# Patient Record
Sex: FEMALE | Race: WHITE | NOT HISPANIC OR LATINO
[De-identification: names, ages, dates, MRNs, and addresses within clinical notes are randomized per-mention and may not be internally consistent; named-entity substitution may affect disease eponyms.]

---

## 2019-02-25 PROBLEM — Z00.00 ENCOUNTER FOR PREVENTIVE HEALTH EXAMINATION: Status: ACTIVE | Noted: 2019-02-25

## 2019-02-27 ENCOUNTER — ASOB RESULT (OUTPATIENT)
Age: 34
End: 2019-02-27

## 2019-02-27 ENCOUNTER — APPOINTMENT (OUTPATIENT)
Dept: ANTEPARTUM | Facility: CLINIC | Age: 34
End: 2019-02-27
Payer: COMMERCIAL

## 2019-02-27 ENCOUNTER — TRANSCRIPTION ENCOUNTER (OUTPATIENT)
Age: 34
End: 2019-02-27

## 2019-02-27 PROCEDURE — 76801 OB US < 14 WKS SINGLE FETUS: CPT

## 2019-02-27 PROCEDURE — 99203 OFFICE O/P NEW LOW 30 MIN: CPT | Mod: 25

## 2019-02-28 ENCOUNTER — APPOINTMENT (OUTPATIENT)
Dept: PEDIATRIC MEDICAL GENETICS | Facility: CLINIC | Age: 34
End: 2019-02-28

## 2019-03-04 ENCOUNTER — OUTPATIENT (OUTPATIENT)
Dept: OUTPATIENT SERVICES | Facility: HOSPITAL | Age: 34
LOS: 1 days | End: 2019-03-04
Payer: COMMERCIAL

## 2019-03-04 ENCOUNTER — APPOINTMENT (OUTPATIENT)
Dept: OBGYN | Facility: CLINIC | Age: 34
End: 2019-03-04
Payer: COMMERCIAL

## 2019-03-04 ENCOUNTER — TRANSCRIPTION ENCOUNTER (OUTPATIENT)
Age: 34
End: 2019-03-04

## 2019-03-04 VITALS
SYSTOLIC BLOOD PRESSURE: 107 MMHG | OXYGEN SATURATION: 98 % | HEIGHT: 67 IN | TEMPERATURE: 98 F | HEART RATE: 99 BPM | WEIGHT: 181 LBS | RESPIRATION RATE: 14 BRPM | DIASTOLIC BLOOD PRESSURE: 73 MMHG

## 2019-03-04 VITALS
BODY MASS INDEX: 30.57 KG/M2 | SYSTOLIC BLOOD PRESSURE: 136 MMHG | DIASTOLIC BLOOD PRESSURE: 88 MMHG | WEIGHT: 183.5 LBS | HEIGHT: 65 IN

## 2019-03-04 DIAGNOSIS — O35.0XX0 MATERNAL CARE FOR (SUSPECTED) CENTRAL NERVOUS SYSTEM MALFORMATION IN FETUS, NOT APPLICABLE OR UNSPECIFIED: ICD-10-CM

## 2019-03-04 DIAGNOSIS — Z01.818 ENCOUNTER FOR OTHER PREPROCEDURAL EXAMINATION: ICD-10-CM

## 2019-03-04 DIAGNOSIS — Z78.9 OTHER SPECIFIED HEALTH STATUS: ICD-10-CM

## 2019-03-04 DIAGNOSIS — O35.9XX0 MATERNAL CARE FOR (SUSPECTED) FETAL ABNORMALITY AND DAMAGE, UNSPECIFIED, NOT APPLICABLE OR UNSPECIFIED: ICD-10-CM

## 2019-03-04 LAB
BASOPHILS # BLD AUTO: 0.03 K/UL
BASOPHILS NFR BLD AUTO: 0.3 %
BLD GP AB SCN SERPL QL: NEGATIVE — SIGNIFICANT CHANGE UP
EOSINOPHIL # BLD AUTO: 0.1 K/UL
EOSINOPHIL NFR BLD AUTO: 1.1 %
HCT VFR BLD CALC: 39.8 %
HCT VFR BLD CALC: 41 % — SIGNIFICANT CHANGE UP (ref 34.5–45)
HGB BLD-MCNC: 13 G/DL
HGB BLD-MCNC: 13.2 G/DL — SIGNIFICANT CHANGE UP (ref 11.5–15.5)
IMM GRANULOCYTES NFR BLD AUTO: 0.4 %
LYMPHOCYTES # BLD AUTO: 1.48 K/UL
LYMPHOCYTES NFR BLD AUTO: 16.6 %
MAN DIFF?: NORMAL
MCHC RBC-ENTMCNC: 29.3 PG — SIGNIFICANT CHANGE UP (ref 27–34)
MCHC RBC-ENTMCNC: 29.9 PG
MCHC RBC-ENTMCNC: 32.2 GM/DL — SIGNIFICANT CHANGE UP (ref 32–36)
MCHC RBC-ENTMCNC: 32.7 GM/DL
MCV RBC AUTO: 90.9 FL — SIGNIFICANT CHANGE UP (ref 80–100)
MCV RBC AUTO: 91.5 FL
MONOCYTES # BLD AUTO: 0.78 K/UL
MONOCYTES NFR BLD AUTO: 8.7 %
NEUTROPHILS # BLD AUTO: 6.51 K/UL
NEUTROPHILS NFR BLD AUTO: 72.9 %
PLATELET # BLD AUTO: 289 K/UL
PLATELET # BLD AUTO: 295 K/UL — SIGNIFICANT CHANGE UP (ref 150–400)
RBC # BLD: 4.35 M/UL
RBC # BLD: 4.51 M/UL — SIGNIFICANT CHANGE UP (ref 3.8–5.2)
RBC # FLD: 12.6 % — SIGNIFICANT CHANGE UP (ref 10.3–14.5)
RBC # FLD: 12.8 %
RH IG SCN BLD-IMP: POSITIVE — SIGNIFICANT CHANGE UP
WBC # BLD: 11.5 K/UL — HIGH (ref 3.8–10.5)
WBC # FLD AUTO: 11.5 K/UL — HIGH (ref 3.8–10.5)
WBC # FLD AUTO: 8.94 K/UL

## 2019-03-04 PROCEDURE — 86850 RBC ANTIBODY SCREEN: CPT

## 2019-03-04 PROCEDURE — 86900 BLOOD TYPING SEROLOGIC ABO: CPT

## 2019-03-04 PROCEDURE — 86901 BLOOD TYPING SEROLOGIC RH(D): CPT

## 2019-03-04 PROCEDURE — 99205 OFFICE O/P NEW HI 60 MIN: CPT

## 2019-03-04 PROCEDURE — G0463: CPT

## 2019-03-04 PROCEDURE — 85027 COMPLETE CBC AUTOMATED: CPT

## 2019-03-04 RX ORDER — LIDOCAINE HCL 20 MG/ML
0.2 VIAL (ML) INJECTION ONCE
Qty: 0 | Refills: 0 | Status: DISCONTINUED | OUTPATIENT
Start: 2019-03-05 | End: 2019-03-20

## 2019-03-04 RX ORDER — SODIUM CHLORIDE 9 MG/ML
3 INJECTION INTRAMUSCULAR; INTRAVENOUS; SUBCUTANEOUS EVERY 8 HOURS
Qty: 0 | Refills: 0 | Status: DISCONTINUED | OUTPATIENT
Start: 2019-03-05 | End: 2019-03-20

## 2019-03-04 RX ORDER — FOLIC ACID 1 MG/1
1 TABLET ORAL DAILY
Qty: 120 | Refills: 11 | Status: ACTIVE | COMMUNITY
Start: 2019-03-04 | End: 1900-01-01

## 2019-03-04 NOTE — HISTORY OF PRESENT ILLNESS
[Definite:  ___ (Date)] : the last menstrual period was [unfilled] [Regular Cycle Intervals] : periods have been regular [Sexually Active] : is sexually active [Monogamous] : is monogamous

## 2019-03-04 NOTE — H&P PST ADULT - NSANTHOSAYNRD_GEN_A_CORE
No. JAN screening performed.  STOP BANG Legend: 0-2 = LOW Risk; 3-4 = INTERMEDIATE Risk; 5-8 = HIGH Risk

## 2019-03-04 NOTE — H&P PST ADULT - HISTORY OF PRESENT ILLNESS
Mrs. Ortega is a 33 year old woman with no significant medical history who is  who went for routine visit then ultrasound then referred to Maternal Child Medicine with diagnosis of Acalvaria in the fetus now scheduled for D&C tomorrow.  Patient denies vaginal bleeding or cramping.

## 2019-03-04 NOTE — H&P PST ADULT - PROBLEM SELECTOR PROBLEM 1
Maternal care for (suspected) central nervous system malformation in fetus, not applicable or unspecified

## 2019-03-04 NOTE — PHYSICAL EXAM
[Awake] : awake [Alert] : alert [Oriented x3] : oriented to person, place, and time [Acute Distress] : no acute distress [Depressed Mood] : not depressed [Flat Affect] : affect not flat

## 2019-03-04 NOTE — H&P PST ADULT - NEUROLOGICAL DETAILS
sensation intact/responds to verbal commands/responds to pain/alert and oriented x 3/normal strength

## 2019-03-04 NOTE — H&P PST ADULT - RS GEN PE MLT RESP DETAILS PC
No
airway patent/clear to auscultation bilaterally/breath sounds equal/respirations non-labored/good air movement

## 2019-03-05 ENCOUNTER — OUTPATIENT (OUTPATIENT)
Dept: OUTPATIENT SERVICES | Facility: HOSPITAL | Age: 34
LOS: 1 days | End: 2019-03-05
Payer: COMMERCIAL

## 2019-03-05 ENCOUNTER — APPOINTMENT (OUTPATIENT)
Dept: OBGYN | Facility: CLINIC | Age: 34
End: 2019-03-05

## 2019-03-05 ENCOUNTER — RESULT REVIEW (OUTPATIENT)
Age: 34
End: 2019-03-05

## 2019-03-05 VITALS
OXYGEN SATURATION: 100 % | HEART RATE: 88 BPM | SYSTOLIC BLOOD PRESSURE: 130 MMHG | TEMPERATURE: 97 F | RESPIRATION RATE: 20 BRPM | DIASTOLIC BLOOD PRESSURE: 84 MMHG

## 2019-03-05 VITALS — HEIGHT: 67 IN | WEIGHT: 181 LBS

## 2019-03-05 DIAGNOSIS — O35.9XX0 MATERNAL CARE FOR (SUSPECTED) FETAL ABNORMALITY AND DAMAGE, UNSPECIFIED, NOT APPLICABLE OR UNSPECIFIED: ICD-10-CM

## 2019-03-05 PROBLEM — N30.00 ACUTE CYSTITIS WITHOUT HEMATURIA: Chronic | Status: ACTIVE | Noted: 2019-03-04

## 2019-03-05 LAB
ABO + RH PNL BLD: NORMAL
C TRACH RRNA SPEC QL NAA+PROBE: NOT DETECTED
N GONORRHOEA RRNA SPEC QL NAA+PROBE: NOT DETECTED
SOURCE AMPLIFICATION: NORMAL

## 2019-03-05 PROCEDURE — 81229 CYTOG ALYS CHRML ABNR SNPCGH: CPT

## 2019-03-05 PROCEDURE — 88264 CHROMOSOME ANALYSIS 20-25: CPT

## 2019-03-05 PROCEDURE — 88305 TISSUE EXAM BY PATHOLOGIST: CPT | Mod: 26

## 2019-03-05 PROCEDURE — 88280 CHROMOSOME KARYOTYPE STUDY: CPT

## 2019-03-05 PROCEDURE — 88305 TISSUE EXAM BY PATHOLOGIST: CPT

## 2019-03-05 PROCEDURE — 88233 TISSUE CULTURE SKIN/BIOPSY: CPT

## 2019-03-05 PROCEDURE — 76998 US GUIDE INTRAOP: CPT | Mod: 26

## 2019-03-05 PROCEDURE — 59840 INDUCED ABORTION D&C: CPT

## 2019-03-05 PROCEDURE — 88291 CYTO/MOLECULAR REPORT: CPT

## 2019-03-05 RX ORDER — ACETAMINOPHEN 500 MG
1000 TABLET ORAL ONCE
Qty: 0 | Refills: 0 | Status: COMPLETED | OUTPATIENT
Start: 2019-03-05 | End: 2019-03-05

## 2019-03-05 RX ORDER — CELECOXIB 200 MG/1
200 CAPSULE ORAL ONCE
Qty: 0 | Refills: 0 | Status: COMPLETED | OUTPATIENT
Start: 2019-03-05 | End: 2019-03-05

## 2019-03-05 RX ORDER — ONDANSETRON 8 MG/1
4 TABLET, FILM COATED ORAL ONCE
Qty: 0 | Refills: 0 | Status: DISCONTINUED | OUTPATIENT
Start: 2019-03-05 | End: 2019-03-20

## 2019-03-05 RX ADMIN — Medication 400 MILLIGRAM(S): at 15:51

## 2019-03-05 NOTE — BRIEF OPERATIVE NOTE - PROCEDURE
<<-----Click on this checkbox to enter Procedure Dilation and curettage  03/05/2019  1. examination under anesthesia  2. dilation and curettage under ultrasound guidance  Active  Maria Guadalupe De Los Santos

## 2019-03-05 NOTE — ASU DISCHARGE PLAN (ADULT/PEDIATRIC) - CALL YOUR DOCTOR IF YOU HAVE ANY OF THE FOLLOWING:
Inability to tolerate liquids or foods/Fever greater than (need to indicate Fahrenheit or Celsius)/Bleeding that does not stop/Nausea and vomiting that does not stop/Numbness, tingling, color or temperature change to extremity/Pain not relieved by Medications/Unable to urinate

## 2019-03-05 NOTE — BRIEF OPERATIVE NOTE - POST-OP DX
Acalvaria  03/05/2019    Active  Maria Guadalupe De Los Santos
Acalvaria  03/05/2019    Active  Maria Guadalupe De Los Santos

## 2019-03-05 NOTE — ASU DISCHARGE PLAN (ADULT/PEDIATRIC) - CARE PROVIDER_API CALL
Tiffany Chen)  [address], [address] [place] [address]  Phone: [phone]  Fax: [phone]  Follow Up Time:

## 2019-03-05 NOTE — ASU DISCHARGE PLAN (ADULT/PEDIATRIC) - ASU DC SPECIAL INSTRUCTIONSFT
Expect abdominal cramping/pain and spotting for the next week. Take ibuprofen and Tylenol for cramping. Use a pad as needed. Call your physician or go to the emergency room if you experience any of the following: heavy vaginal bleeding (soaking more than 2 pads in 1 hour for 2 hours), fever, chills, nausea, vomiting, or pain that is not controlled by medication. Follow-up with you OB/GYN in 2 weeks.

## 2019-03-05 NOTE — BRIEF OPERATIVE NOTE - PROCEDURE
<<-----Click on this checkbox to enter Procedure Dilation and curettage  03/05/2019    Active  RBUESER

## 2019-03-05 NOTE — BRIEF OPERATIVE NOTE - PRE-OP DX
Acalvaria  03/05/2019  IUP @ 11 3/7 weeks  Active  Naida Marcus
Acalvaria  03/05/2019    Active  Maria Guadalupe De Los Santos

## 2019-03-05 NOTE — BRIEF OPERATIVE NOTE - OPERATION/FINDINGS
EUA revealed a 11 week size, anteverted uterus, adnexa nonpalpable b/l.  Contents of uterus removed using vacuum curettage under ultrasound guidance. Minimal bleeding

## 2019-03-11 ENCOUNTER — RESULT REVIEW (OUTPATIENT)
Age: 34
End: 2019-03-11

## 2019-03-11 LAB — SURGICAL PATHOLOGY STUDY: SIGNIFICANT CHANGE UP

## 2019-03-18 LAB — CHROM ANALY OVERALL INTERP SPEC-IMP: SIGNIFICANT CHANGE UP

## 2019-03-22 ENCOUNTER — APPOINTMENT (OUTPATIENT)
Dept: OBGYN | Facility: CLINIC | Age: 34
End: 2019-03-22

## 2019-03-22 VITALS
BODY MASS INDEX: 30.72 KG/M2 | HEART RATE: 96 BPM | OXYGEN SATURATION: 99 % | SYSTOLIC BLOOD PRESSURE: 144 MMHG | HEIGHT: 65 IN | DIASTOLIC BLOOD PRESSURE: 89 MMHG | WEIGHT: 184.38 LBS

## 2019-03-22 DIAGNOSIS — Z09 ENCOUNTER FOR FOLLOW-UP EXAMINATION AFTER COMPLETED TREATMENT FOR CONDITIONS OTHER THAN MALIGNANT NEOPLASM: ICD-10-CM

## 2019-03-30 PROBLEM — Z09 POSTOP CHECK: Status: ACTIVE | Noted: 2019-03-30

## 2019-03-30 NOTE — PHYSICAL EXAM
[Awake] : awake [Alert] : alert [Oriented x3] : oriented to person, place, and time [No Lesions] : no genitalia lesions [Labia Majora] : labia major [Labia Minora] : labia minora [Normal] : clitoris [No Bleeding] : there was no active vaginal bleeding [Anteversion] : anteverted [Uterine Adnexae] : were not tender and not enlarged [Acute Distress] : no acute distress [Depressed Mood] : not depressed [Flat Affect] : affect not flat [Discharge] : had no discharge [Dilated] : the cervix was not dilated [Motion Tenderness] : there was no cervical motion tenderness [Tenderness] : nontender [Enlarged ___ wks] : not enlarged

## 2019-04-27 LAB — MICROARRAY DELETION: SIGNIFICANT CHANGE UP

## 2019-05-03 ENCOUNTER — RESULT REVIEW (OUTPATIENT)
Age: 34
End: 2019-05-03

## 2019-07-18 ENCOUNTER — APPOINTMENT (OUTPATIENT)
Dept: ANTEPARTUM | Facility: CLINIC | Age: 34
End: 2019-07-18
Payer: COMMERCIAL

## 2019-07-18 ENCOUNTER — ASOB RESULT (OUTPATIENT)
Age: 34
End: 2019-07-18

## 2019-07-18 PROCEDURE — 36415 COLL VENOUS BLD VENIPUNCTURE: CPT

## 2019-07-18 PROCEDURE — 76801 OB US < 14 WKS SINGLE FETUS: CPT

## 2019-07-18 PROCEDURE — 99213 OFFICE O/P EST LOW 20 MIN: CPT | Mod: 25

## 2019-07-18 PROCEDURE — 36416 COLLJ CAPILLARY BLOOD SPEC: CPT

## 2019-07-18 PROCEDURE — 76813 OB US NUCHAL MEAS 1 GEST: CPT

## 2019-07-25 ENCOUNTER — APPOINTMENT (OUTPATIENT)
Dept: ANTEPARTUM | Facility: CLINIC | Age: 34
End: 2019-07-25

## 2019-08-19 ENCOUNTER — ASOB RESULT (OUTPATIENT)
Age: 34
End: 2019-08-19

## 2019-08-19 ENCOUNTER — APPOINTMENT (OUTPATIENT)
Dept: ANTEPARTUM | Facility: CLINIC | Age: 34
End: 2019-08-19
Payer: COMMERCIAL

## 2019-08-19 PROCEDURE — 99213 OFFICE O/P EST LOW 20 MIN: CPT | Mod: 25

## 2019-08-19 PROCEDURE — 76805 OB US >/= 14 WKS SNGL FETUS: CPT

## 2019-09-17 ENCOUNTER — APPOINTMENT (OUTPATIENT)
Dept: ANTEPARTUM | Facility: CLINIC | Age: 34
End: 2019-09-17
Payer: COMMERCIAL

## 2019-09-17 ENCOUNTER — ASOB RESULT (OUTPATIENT)
Age: 34
End: 2019-09-17

## 2019-09-17 PROCEDURE — 76811 OB US DETAILED SNGL FETUS: CPT

## 2019-10-17 NOTE — ASU PREOP CHECKLIST - BSA (M2)
A Family Medicine Doctor  Family Medicine  .  [place]   Phone:   Fax:   Follow Up Time: 1-3 Days    [facility]  ENT  [address], 2nd [place] [address]  Phone: [phone]  Fax:   Follow Up Time: 1-3 Days
1.94

## 2020-01-08 ENCOUNTER — APPOINTMENT (OUTPATIENT)
Dept: ANTEPARTUM | Facility: CLINIC | Age: 35
End: 2020-01-08
Payer: COMMERCIAL

## 2020-01-08 ENCOUNTER — OUTPATIENT (OUTPATIENT)
Dept: OUTPATIENT SERVICES | Facility: HOSPITAL | Age: 35
LOS: 1 days | End: 2020-01-08

## 2020-01-08 ENCOUNTER — APPOINTMENT (OUTPATIENT)
Dept: ANTEPARTUM | Facility: HOSPITAL | Age: 35
End: 2020-01-08

## 2020-01-08 ENCOUNTER — ASOB RESULT (OUTPATIENT)
Age: 35
End: 2020-01-08

## 2020-01-08 PROCEDURE — 76816 OB US FOLLOW-UP PER FETUS: CPT

## 2020-01-08 PROCEDURE — 76818 FETAL BIOPHYS PROFILE W/NST: CPT | Mod: 26

## 2020-01-08 PROCEDURE — 99214 OFFICE O/P EST MOD 30 MIN: CPT | Mod: 25

## 2020-01-16 ENCOUNTER — APPOINTMENT (OUTPATIENT)
Dept: ANTEPARTUM | Facility: CLINIC | Age: 35
End: 2020-01-16
Payer: COMMERCIAL

## 2020-01-16 ENCOUNTER — ASOB RESULT (OUTPATIENT)
Age: 35
End: 2020-01-16

## 2020-01-16 ENCOUNTER — APPOINTMENT (OUTPATIENT)
Dept: ANTEPARTUM | Facility: HOSPITAL | Age: 35
End: 2020-01-16

## 2020-01-16 ENCOUNTER — OUTPATIENT (OUTPATIENT)
Dept: OUTPATIENT SERVICES | Facility: HOSPITAL | Age: 35
LOS: 1 days | End: 2020-01-16

## 2020-01-16 PROCEDURE — 76818 FETAL BIOPHYS PROFILE W/NST: CPT | Mod: 26

## 2020-01-21 DIAGNOSIS — Z82.79 FAMILY HISTORY OF OTHER CONGENITAL MALFORMATIONS, DEFORMATIONS AND CHROMOSOMAL ABNORMALITIES: ICD-10-CM

## 2020-01-21 DIAGNOSIS — O09.523 SUPERVISION OF ELDERLY MULTIGRAVIDA, THIRD TRIMESTER: ICD-10-CM

## 2020-01-21 DIAGNOSIS — O26.613 LIVER AND BILIARY TRACT DISORDERS IN PREGNANCY, THIRD TRIMESTER: ICD-10-CM

## 2020-01-21 DIAGNOSIS — O12.13 GESTATIONAL PROTEINURIA, THIRD TRIMESTER: ICD-10-CM

## 2020-01-21 DIAGNOSIS — O99.283 ENDOCRINE, NUTRITIONAL AND METABOLIC DISEASES COMPLICATING PREGNANCY, THIRD TRIMESTER: ICD-10-CM

## 2020-01-21 DIAGNOSIS — O09.293 SUPERVISION OF PREGNANCY WITH OTHER POOR REPRODUCTIVE OR OBSTETRIC HISTORY, THIRD TRIMESTER: ICD-10-CM

## 2020-01-22 DIAGNOSIS — O09.293 SUPERVISION OF PREGNANCY WITH OTHER POOR REPRODUCTIVE OR OBSTETRIC HISTORY, THIRD TRIMESTER: ICD-10-CM

## 2020-01-22 DIAGNOSIS — O09.523 SUPERVISION OF ELDERLY MULTIGRAVIDA, THIRD TRIMESTER: ICD-10-CM

## 2020-01-22 DIAGNOSIS — O26.613 LIVER AND BILIARY TRACT DISORDERS IN PREGNANCY, THIRD TRIMESTER: ICD-10-CM

## 2020-01-22 DIAGNOSIS — O12.13 GESTATIONAL PROTEINURIA, THIRD TRIMESTER: ICD-10-CM

## 2020-01-23 ENCOUNTER — APPOINTMENT (OUTPATIENT)
Dept: ANTEPARTUM | Facility: CLINIC | Age: 35
End: 2020-01-23
Payer: COMMERCIAL

## 2020-01-23 ENCOUNTER — APPOINTMENT (OUTPATIENT)
Dept: ANTEPARTUM | Facility: HOSPITAL | Age: 35
End: 2020-01-23

## 2020-01-23 ENCOUNTER — OUTPATIENT (OUTPATIENT)
Dept: OUTPATIENT SERVICES | Facility: HOSPITAL | Age: 35
LOS: 1 days | End: 2020-01-23

## 2020-01-23 ENCOUNTER — ASOB RESULT (OUTPATIENT)
Age: 35
End: 2020-01-23

## 2020-01-23 VITALS
SYSTOLIC BLOOD PRESSURE: 110 MMHG | HEART RATE: 87 BPM | HEIGHT: 66.5 IN | DIASTOLIC BLOOD PRESSURE: 72 MMHG | WEIGHT: 205.91 LBS | RESPIRATION RATE: 14 BRPM | OXYGEN SATURATION: 97 % | TEMPERATURE: 99 F

## 2020-01-23 DIAGNOSIS — O12.10 GESTATIONAL PROTEINURIA, UNSPECIFIED TRIMESTER: ICD-10-CM

## 2020-01-23 DIAGNOSIS — Z98.890 OTHER SPECIFIED POSTPROCEDURAL STATES: Chronic | ICD-10-CM

## 2020-01-23 DIAGNOSIS — O13.3 GESTATIONAL [PREGNANCY-INDUCED] HYPERTENSION WITHOUT SIGNIFICANT PROTEINURIA, THIRD TRIMESTER: ICD-10-CM

## 2020-01-23 LAB
ALBUMIN SERPL ELPH-MCNC: 3.7 G/DL — SIGNIFICANT CHANGE UP (ref 3.3–5)
ALP SERPL-CCNC: 164 U/L — HIGH (ref 40–120)
ALT FLD-CCNC: 146 U/L — HIGH (ref 4–33)
ANION GAP SERPL CALC-SCNC: 15 MMO/L — HIGH (ref 7–14)
APPEARANCE UR: SIGNIFICANT CHANGE UP
AST SERPL-CCNC: 55 U/L — HIGH (ref 4–32)
BACTERIA # UR AUTO: SIGNIFICANT CHANGE UP
BILIRUB SERPL-MCNC: 0.3 MG/DL — SIGNIFICANT CHANGE UP (ref 0.2–1.2)
BILIRUB UR-MCNC: NEGATIVE — SIGNIFICANT CHANGE UP
BLD GP AB SCN SERPL QL: NEGATIVE — SIGNIFICANT CHANGE UP
BLOOD UR QL VISUAL: NEGATIVE — SIGNIFICANT CHANGE UP
BUN SERPL-MCNC: 8 MG/DL — SIGNIFICANT CHANGE UP (ref 7–23)
CALCIUM SERPL-MCNC: 9.9 MG/DL — SIGNIFICANT CHANGE UP (ref 8.4–10.5)
CHLORIDE SERPL-SCNC: 105 MMOL/L — SIGNIFICANT CHANGE UP (ref 98–107)
CO2 SERPL-SCNC: 19 MMOL/L — LOW (ref 22–31)
COLOR SPEC: YELLOW — SIGNIFICANT CHANGE UP
CREAT SERPL-MCNC: 0.68 MG/DL — SIGNIFICANT CHANGE UP (ref 0.5–1.3)
GLUCOSE SERPL-MCNC: 110 MG/DL — HIGH (ref 70–99)
GLUCOSE UR-MCNC: NEGATIVE — SIGNIFICANT CHANGE UP
HCT VFR BLD CALC: 36.2 % — SIGNIFICANT CHANGE UP (ref 34.5–45)
HGB BLD-MCNC: 12 G/DL — SIGNIFICANT CHANGE UP (ref 11.5–15.5)
KETONES UR-MCNC: NEGATIVE — SIGNIFICANT CHANGE UP
LEUKOCYTE ESTERASE UR-ACNC: NEGATIVE — SIGNIFICANT CHANGE UP
MCHC RBC-ENTMCNC: 30 PG — SIGNIFICANT CHANGE UP (ref 27–34)
MCHC RBC-ENTMCNC: 33.1 % — SIGNIFICANT CHANGE UP (ref 32–36)
MCV RBC AUTO: 90.5 FL — SIGNIFICANT CHANGE UP (ref 80–100)
NITRITE UR-MCNC: NEGATIVE — SIGNIFICANT CHANGE UP
NRBC # FLD: 0 K/UL — SIGNIFICANT CHANGE UP (ref 0–0)
PH UR: 7 — SIGNIFICANT CHANGE UP (ref 5–8)
PLATELET # BLD AUTO: 216 K/UL — SIGNIFICANT CHANGE UP (ref 150–400)
PMV BLD: 12.3 FL — SIGNIFICANT CHANGE UP (ref 7–13)
POTASSIUM SERPL-MCNC: 3.5 MMOL/L — SIGNIFICANT CHANGE UP (ref 3.5–5.3)
POTASSIUM SERPL-SCNC: 3.5 MMOL/L — SIGNIFICANT CHANGE UP (ref 3.5–5.3)
PROT SERPL-MCNC: 6.9 G/DL — SIGNIFICANT CHANGE UP (ref 6–8.3)
PROT UR-MCNC: 30 — SIGNIFICANT CHANGE UP
RBC # BLD: 4 M/UL — SIGNIFICANT CHANGE UP (ref 3.8–5.2)
RBC # FLD: 13.3 % — SIGNIFICANT CHANGE UP (ref 10.3–14.5)
RBC CASTS # UR COMP ASSIST: SIGNIFICANT CHANGE UP (ref 0–?)
RH IG SCN BLD-IMP: POSITIVE — SIGNIFICANT CHANGE UP
SODIUM SERPL-SCNC: 139 MMOL/L — SIGNIFICANT CHANGE UP (ref 135–145)
SP GR SPEC: 1.02 — SIGNIFICANT CHANGE UP (ref 1–1.04)
SQUAMOUS # UR AUTO: SIGNIFICANT CHANGE UP
T PALLIDUM AB TITR SER: NEGATIVE — SIGNIFICANT CHANGE UP
UROBILINOGEN FLD QL: NORMAL — SIGNIFICANT CHANGE UP
WBC # BLD: 9.57 K/UL — SIGNIFICANT CHANGE UP (ref 3.8–10.5)
WBC # FLD AUTO: 9.57 K/UL — SIGNIFICANT CHANGE UP (ref 3.8–10.5)
WBC UR QL: SIGNIFICANT CHANGE UP (ref 0–?)

## 2020-01-23 PROCEDURE — 76818 FETAL BIOPHYS PROFILE W/NST: CPT | Mod: 26

## 2020-01-23 NOTE — OB PST NOTE - HISTORY OF PRESENT ILLNESS
35y/o female scheduled for cytoec of labor with cervical laguna for gestational hypertension on 1/31/2020.  As per induction worksheet lmp 5/4/2019, EILEEN 2/4/2020, as protein in urine since 12/2019 currently being monitored bp is within normal range.  At the same time was dx with elevated liver enzymes.  Reports good fetal movement."

## 2020-01-23 NOTE — OB PST NOTE - PROBLEM SELECTOR PLAN 1
Pt scheduled for cytotec induction of labor with cervical laguna for gestational hypertension on 1/31/2020.  labs done results pending, Preop teaching done, pt able to verbalize understanding.     meds day of surgery levothyroxine    OR booking notified of sulfa allergy

## 2020-01-23 NOTE — OB PST NOTE - NSHPREVIEWOFSYSTEMS_GEN_ALL_CORE
General: No fever, chills, sweating, anorexia, weight loss or weight gain. No polyphagia, polyurea, polydypsia, malaise, or fatigue    Skin: No rashes, itching, or dryness. No change in size/color of moles. No tumors, brittle nails, pitted nails, or hair loss    Breast: No tenderness, lumps, or nipple discharge      Ophthalmologic: No diplopia, photophobia, lacrimation, blurred Vision , or eye discharge    ENMT Symptoms: No hearing difficulty, ear pain, tinnitus, or vertigo. No sinus symptoms, nasal congestion, nasal   discharge, or nasal obstruction    Respiratory and Thorax: No wheezing, dyspnea, cough, hemoptysis, or pleuritic chest pPain     Cardiovascular: No chest pain, palpitations, dyspnea on exertion, orthopnea, paroxysmal nocturnal dyspnea,   peripheral edema, or claudication    Gastrointestinal: for the past month has elevated liver enzymes, and protein in urine, bp remains with in normal limits  No nausea, vomiting, diarrhea, constipation, change in bowel habits, flatulence, abdominal pain, or melena    Genitourinary/ Pelvis: No hematuria, renal colic, or flank pain.  No urine discoloration, incontinence, dysuria, or urinary hesitancy. Normal urinary frequency. No nocturia, abnormal vaginal bleeding, vaginal discharge, spotting, pelvic pain, or vaginal leakage    Musculoskeletal: No arthralgia, arthritis, joint swelling, muscle cramping, muscle weakness, neck pain, arm pain, or leg pain    Neurological: No transient paralysis, weakness, paresthesias, or seizures. No syncope, tremors, vertigo, loss of sensation, difficulty walking, loss of consciousness, hemiparesis, confusion, or facial palsy    Psychiatric: No suicidal ideation, depression, anxiety, insomnia, memory loss, paranoia, mood swings, agitation, hallucinations, or hyperactivity    Hematology: No gum bleeding, nose bleeding, or skin lumps    Lymphatic: No enlarged or tender lymph nodes. No extremity swelling    Endocrine: hypothyroidism since pregnancy, always on same dose- tsh followed by gynNo heat or cold intolerance    Immunologic: No recurrent or persistent infections

## 2020-01-23 NOTE — OB PST NOTE - PMH
Acute cystitis without hematuria  1/26/2019 Acute cystitis without hematuria  1/26/2019  Elevated liver enzymes    Proteinuria complicating pregnancy

## 2020-01-24 DIAGNOSIS — O09.293 SUPERVISION OF PREGNANCY WITH OTHER POOR REPRODUCTIVE OR OBSTETRIC HISTORY, THIRD TRIMESTER: ICD-10-CM

## 2020-01-24 DIAGNOSIS — O26.613 LIVER AND BILIARY TRACT DISORDERS IN PREGNANCY, THIRD TRIMESTER: ICD-10-CM

## 2020-01-24 DIAGNOSIS — O12.13 GESTATIONAL PROTEINURIA, THIRD TRIMESTER: ICD-10-CM

## 2020-01-24 DIAGNOSIS — O99.283 ENDOCRINE, NUTRITIONAL AND METABOLIC DISEASES COMPLICATING PREGNANCY, THIRD TRIMESTER: ICD-10-CM

## 2020-01-24 DIAGNOSIS — Z82.79 FAMILY HISTORY OF OTHER CONGENITAL MALFORMATIONS, DEFORMATIONS AND CHROMOSOMAL ABNORMALITIES: ICD-10-CM

## 2020-01-24 DIAGNOSIS — O09.523 SUPERVISION OF ELDERLY MULTIGRAVIDA, THIRD TRIMESTER: ICD-10-CM

## 2020-01-24 PROBLEM — O12.10: Chronic | Status: ACTIVE | Noted: 2020-01-23

## 2020-01-24 PROBLEM — R74.8 ABNORMAL LEVELS OF OTHER SERUM ENZYMES: Chronic | Status: ACTIVE | Noted: 2020-01-23

## 2020-01-27 ENCOUNTER — INPATIENT (INPATIENT)
Facility: HOSPITAL | Age: 35
LOS: 3 days | Discharge: HOME | End: 2020-01-31
Attending: STUDENT IN AN ORGANIZED HEALTH CARE EDUCATION/TRAINING PROGRAM | Admitting: STUDENT IN AN ORGANIZED HEALTH CARE EDUCATION/TRAINING PROGRAM

## 2020-01-27 VITALS — SYSTOLIC BLOOD PRESSURE: 138 MMHG | HEART RATE: 110 BPM | DIASTOLIC BLOOD PRESSURE: 82 MMHG

## 2020-01-27 DIAGNOSIS — Z98.890 OTHER SPECIFIED POSTPROCEDURAL STATES: Chronic | ICD-10-CM

## 2020-01-27 DIAGNOSIS — O26.899 OTHER SPECIFIED PREGNANCY RELATED CONDITIONS, UNSPECIFIED TRIMESTER: ICD-10-CM

## 2020-01-27 DIAGNOSIS — Z3A.00 WEEKS OF GESTATION OF PREGNANCY NOT SPECIFIED: ICD-10-CM

## 2020-01-27 LAB
ALBUMIN SERPL ELPH-MCNC: 3.4 G/DL — SIGNIFICANT CHANGE UP (ref 3.3–5)
ALP SERPL-CCNC: 183 U/L — HIGH (ref 40–120)
ALT FLD-CCNC: 143 U/L — HIGH (ref 4–33)
ANION GAP SERPL CALC-SCNC: 14 MMO/L — SIGNIFICANT CHANGE UP (ref 7–14)
APPEARANCE UR: SIGNIFICANT CHANGE UP
APTT BLD: 24.8 SEC — LOW (ref 27.5–36.3)
AST SERPL-CCNC: 60 U/L — HIGH (ref 4–32)
BACTERIA # UR AUTO: HIGH
BASOPHILS # BLD AUTO: 0.03 K/UL — SIGNIFICANT CHANGE UP (ref 0–0.2)
BASOPHILS NFR BLD AUTO: 0.3 % — SIGNIFICANT CHANGE UP (ref 0–2)
BILIRUB SERPL-MCNC: 0.3 MG/DL — SIGNIFICANT CHANGE UP (ref 0.2–1.2)
BILIRUB UR-MCNC: NEGATIVE — SIGNIFICANT CHANGE UP
BLD GP AB SCN SERPL QL: NEGATIVE — SIGNIFICANT CHANGE UP
BLOOD UR QL VISUAL: NEGATIVE — SIGNIFICANT CHANGE UP
BUN SERPL-MCNC: 7 MG/DL — SIGNIFICANT CHANGE UP (ref 7–23)
CALCIUM SERPL-MCNC: 9.5 MG/DL — SIGNIFICANT CHANGE UP (ref 8.4–10.5)
CHLORIDE SERPL-SCNC: 104 MMOL/L — SIGNIFICANT CHANGE UP (ref 98–107)
CO2 SERPL-SCNC: 17 MMOL/L — LOW (ref 22–31)
COLOR SPEC: YELLOW — SIGNIFICANT CHANGE UP
CREAT ?TM UR-MCNC: 58.9 MG/DL — SIGNIFICANT CHANGE UP
CREAT SERPL-MCNC: 0.6 MG/DL — SIGNIFICANT CHANGE UP (ref 0.5–1.3)
EOSINOPHIL # BLD AUTO: 0.04 K/UL — SIGNIFICANT CHANGE UP (ref 0–0.5)
EOSINOPHIL NFR BLD AUTO: 0.4 % — SIGNIFICANT CHANGE UP (ref 0–6)
FIBRINOGEN PPP-MCNC: 788 MG/DL — HIGH (ref 350–510)
GLUCOSE SERPL-MCNC: 87 MG/DL — SIGNIFICANT CHANGE UP (ref 70–99)
GLUCOSE UR-MCNC: NEGATIVE — SIGNIFICANT CHANGE UP
HCT VFR BLD CALC: 33.5 % — LOW (ref 34.5–45)
HGB BLD-MCNC: 11.3 G/DL — LOW (ref 11.5–15.5)
IMM GRANULOCYTES NFR BLD AUTO: 1.3 % — SIGNIFICANT CHANGE UP (ref 0–1.5)
INR BLD: 0.96 — SIGNIFICANT CHANGE UP (ref 0.88–1.17)
KETONES UR-MCNC: NEGATIVE — SIGNIFICANT CHANGE UP
LDH SERPL L TO P-CCNC: 179 U/L — SIGNIFICANT CHANGE UP (ref 135–225)
LEUKOCYTE ESTERASE UR-ACNC: NEGATIVE — SIGNIFICANT CHANGE UP
LYMPHOCYTES # BLD AUTO: 1.61 K/UL — SIGNIFICANT CHANGE UP (ref 1–3.3)
LYMPHOCYTES # BLD AUTO: 14.6 % — SIGNIFICANT CHANGE UP (ref 13–44)
MCHC RBC-ENTMCNC: 30.6 PG — SIGNIFICANT CHANGE UP (ref 27–34)
MCHC RBC-ENTMCNC: 33.7 % — SIGNIFICANT CHANGE UP (ref 32–36)
MCV RBC AUTO: 90.8 FL — SIGNIFICANT CHANGE UP (ref 80–100)
MONOCYTES # BLD AUTO: 0.75 K/UL — SIGNIFICANT CHANGE UP (ref 0–0.9)
MONOCYTES NFR BLD AUTO: 6.8 % — SIGNIFICANT CHANGE UP (ref 2–14)
NEUTROPHILS # BLD AUTO: 8.46 K/UL — HIGH (ref 1.8–7.4)
NEUTROPHILS NFR BLD AUTO: 76.6 % — SIGNIFICANT CHANGE UP (ref 43–77)
NITRITE UR-MCNC: NEGATIVE — SIGNIFICANT CHANGE UP
NRBC # FLD: 0 K/UL — SIGNIFICANT CHANGE UP (ref 0–0)
PH UR: 7 — SIGNIFICANT CHANGE UP (ref 5–8)
PLATELET # BLD AUTO: 215 K/UL — SIGNIFICANT CHANGE UP (ref 150–400)
PMV BLD: 12.3 FL — SIGNIFICANT CHANGE UP (ref 7–13)
POTASSIUM SERPL-MCNC: 3.6 MMOL/L — SIGNIFICANT CHANGE UP (ref 3.5–5.3)
POTASSIUM SERPL-SCNC: 3.6 MMOL/L — SIGNIFICANT CHANGE UP (ref 3.5–5.3)
PROT SERPL-MCNC: 6.8 G/DL — SIGNIFICANT CHANGE UP (ref 6–8.3)
PROT UR-MCNC: 10 — SIGNIFICANT CHANGE UP
PROT UR-MCNC: 15.8 MG/DL — SIGNIFICANT CHANGE UP
PROTHROM AB SERPL-ACNC: 10.9 SEC — SIGNIFICANT CHANGE UP (ref 9.8–13.1)
RBC # BLD: 3.69 M/UL — LOW (ref 3.8–5.2)
RBC # FLD: 13.7 % — SIGNIFICANT CHANGE UP (ref 10.3–14.5)
RBC CASTS # UR COMP ASSIST: SIGNIFICANT CHANGE UP (ref 0–?)
RH IG SCN BLD-IMP: POSITIVE — SIGNIFICANT CHANGE UP
SODIUM SERPL-SCNC: 135 MMOL/L — SIGNIFICANT CHANGE UP (ref 135–145)
SP GR SPEC: 1.01 — SIGNIFICANT CHANGE UP (ref 1–1.04)
SQUAMOUS # UR AUTO: SIGNIFICANT CHANGE UP
T PALLIDUM AB TITR SER: NEGATIVE — SIGNIFICANT CHANGE UP
URATE SERPL-MCNC: 4.4 MG/DL — SIGNIFICANT CHANGE UP (ref 2.5–7)
UROBILINOGEN FLD QL: NORMAL — SIGNIFICANT CHANGE UP
WBC # BLD: 11.03 K/UL — HIGH (ref 3.8–10.5)
WBC # FLD AUTO: 11.03 K/UL — HIGH (ref 3.8–10.5)
WBC UR QL: HIGH (ref 0–?)

## 2020-01-27 RX ORDER — OXYTOCIN 10 UNIT/ML
333.33 VIAL (ML) INJECTION
Qty: 20 | Refills: 0 | Status: DISCONTINUED | OUTPATIENT
Start: 2020-01-27 | End: 2020-01-29

## 2020-01-27 RX ORDER — LEVOTHYROXINE SODIUM 125 MCG
50 TABLET ORAL DAILY
Refills: 0 | Status: DISCONTINUED | OUTPATIENT
Start: 2020-01-27 | End: 2020-01-31

## 2020-01-27 RX ORDER — SODIUM CHLORIDE 9 MG/ML
1000 INJECTION, SOLUTION INTRAVENOUS
Refills: 0 | Status: DISCONTINUED | OUTPATIENT
Start: 2020-01-27 | End: 2020-01-29

## 2020-01-27 RX ADMIN — SODIUM CHLORIDE 125 MILLILITER(S): 9 INJECTION, SOLUTION INTRAVENOUS at 12:46

## 2020-01-27 NOTE — OB PROVIDER TRIAGE NOTE - NSHPPHYSICALEXAM_GEN_ALL_CORE
Vital Signs Last 24 Hrs  T(C): --  T(F): --  HR: 81 (27 Jan 2020 11:33) (81 - 110)  BP: 115/71 (27 Jan 2020 11:33) (115/71 - 138/82)  BP(mean): --  RR: --  SpO2: --    General: A&O x3  Respirations: chest movement symmetrical, anterior and posterior lung sounds clear on auscultation  Cardiac: R/R/R  Abdomen: soft, non tender  Peripheral: upper extremities warm, pink, symmetrical in size, pulses 2+ regular rhythm, equal  lower extremities warm, pink, symmetrical in size, pulses 2+ regular rhythm, equal  SVE: 0/50/-3  TAS: vertex presentation  EFW 3430 gm by Leopold's    NST in progress

## 2020-01-27 NOTE — OB RN TRIAGE NOTE - CHIEF COMPLAINT QUOTE
Sent from MD office for probable IOL for cholestasis, itching hands and feet, I have had high liver enzymes, they sent bile acids today

## 2020-01-27 NOTE — OB PROVIDER H&P - PROBLEM SELECTOR PLAN 1
Labor @ 38.6 weeks for presumed cholestasis for IOL PO cytotec   MFM consult approved IOL by Dr Friedman  clear fluids  GBS negative   pre-eclampsia labs pending

## 2020-01-27 NOTE — OB PROVIDER H&P - ASSESSMENT
33 y/o pt 38.6 weeks  sent from Dr Galan's office for possible IOL for presumed cholestasis. pt reports that her hands and feet began itching around 0300. Bile acids sent today in office. pt was a scheduled IOL for 2020 as per MFM consult on 2020 for protein in urine and elevated liver enzymes in 2019. pt reports the blood pressures have been within normal limits during pregnancy and was instructed to monitor blood pressures 3x daily. pt denies headache, visual disturbances, and epigastric pain. pt denies bleeding & LOF. pt reports mild irregular contractions. pt endorses +fetal movement  AP complicated by proteinuria and elevated liver enzymes; scheduled IOL for 2020    Allergies: pt reports family allergy to SULFA, rash. pt reports never taking it  PMH:  Acute cystitis without hematuria  Hypothyroid  PSH: denies  Ob/Gyn:   TOP x1 incomplete (d&c) 2019  Medications:  PNV  Levothyroxine 50mcg Monday-Friday; 100 mcg Saturday &       General: A&O x3  Respirations: chest movement symmetrical, anterior and posterior lung sounds clear on auscultation  Cardiac: R/R/R  Abdomen: soft, non tender  Peripheral: upper extremities warm, pink, symmetrical in size, pulses 2+ regular rhythm, equal  lower extremities warm, pink, symmetrical in size, pulses 2+ regular rhythm, equal  SVE: 0/50/-3  TAS: vertex presentation  EFW 3430 gm by Leopold's    NST reactive with moderate variability, cat 1  toco irregular contractions noted    maternal and fetal status reassuring  d/w with Dr Izzy Henriquez & Dr García  admit l&d  Labor @ 38.6 weeks for presumed cholestasis for IOL PO cytotec   MFM consult approved IOL by Dr Friedman  clear fluids  GBS negative   pre-eclampsia labs pending   see admission orders

## 2020-01-27 NOTE — CHART NOTE - NSCHARTNOTEFT_GEN_A_CORE
R1 TRACING NOTE    No complaints.    Vital Signs Last 24 Hrs  T(C): 37.0 (27 Jan 2020 19:00), Max: 37.0 (27 Jan 2020 19:00)  HR: 71 (27 Jan 2020 22:51) (68 - 110)  BP: 104/69 (27 Jan 2020 22:47) (104/69 - 138/82)  RR: 16 (27 Jan 2020 11:50) (16 - 16)  SpO2: 97% (27 Jan 2020 22:46) (97% - 97%)    /mod/+accel/-decel  [place] q2-4min  VE deferred    IOL ICP in setting of itching, transaminitis  bile acid pending  c/w PO per plan prev d/w Dr. Radha Arndt PGY-1

## 2020-01-27 NOTE — OB PROVIDER TRIAGE NOTE - NSOBPROVIDERNOTE_OBGYN_ALL_OB_FT
33 y/o pt 38.6 weeks  sent from Dr Galan's office for possible IOL for presumed cholestasis. pt reports that her hands and feet began itching around 0300. pt was a scheduled IOL for 2020 as per MFM consult on 2020 for protein in urine and elevated liver enzymes in 2019. pt reports the blood pressures have been within normal limits during pregnancy and was instructed to monitor blood pressures 3x daily. pt denies headache, visual disturbances, and epigastric pain. pt denies bleeding & LOF. pt reports mild irregular contractions. pt endorses +fetal movement  AP complicated by proteinuria and elevated liver enzymes; scheduled IOL for 2020    Allergies: pt reports family allergy to SULFA, rash. pt reports never taking it  PMH:  Acute cystitis without hematuria  Hypothyroid  PSH: denies  Ob/Gyn:   TOP x1 incomplete (d&c) 2019  Medications:  PNV  Levothyroxine 50mcg Monday-Friday; 100 mcg Saturday &       General: A&O x3  Respirations: chest movement symmetrical, anterior and posterior lung sounds clear on auscultation  Cardiac: R/R/R  Abdomen: soft, non tender  Peripheral: upper extremities warm, pink, symmetrical in size, pulses 2+ regular rhythm, equal  lower extremities warm, pink, symmetrical in size, pulses 2+ regular rhythm, equal  SVE: 0/50/-3  TAS: vertex presentation  EFW 3430 gm by Leopold's    NST reactive with moderate variability, cat 1  toco irregular contractions noted    maternal and fetal status reassuring  d/w with Dr Izzy Henriquez & Dr García  admit l&d  Labor @ 38.6 weeks for presumed cholestasis for IOL PO cytotec   MFM consult approved IOL by Dr Friedman  clear fluids  GBS    see admission orders 33 y/o pt 38.6 weeks  sent from Dr Galan's office for possible IOL for presumed cholestasis. pt reports that her hands and feet began itching around 0300. Bile acids sent today in office. pt was a scheduled IOL for 2020 as per MFM consult on 2020 for protein in urine and elevated liver enzymes in 2019. pt reports the blood pressures have been within normal limits during pregnancy and was instructed to monitor blood pressures 3x daily. pt denies headache, visual disturbances, and epigastric pain. pt denies bleeding & LOF. pt reports mild irregular contractions. pt endorses +fetal movement  AP complicated by proteinuria and elevated liver enzymes; scheduled IOL for 2020    Allergies: pt reports family allergy to SULFA, rash. pt reports never taking it  PMH:  Acute cystitis without hematuria  Hypothyroid  PSH: denies  Ob/Gyn:   TOP x1 incomplete (d&c) 2019  Medications:  PNV  Levothyroxine 50mcg Monday-Friday; 100 mcg Saturday &       General: A&O x3  Respirations: chest movement symmetrical, anterior and posterior lung sounds clear on auscultation  Cardiac: R/R/R  Abdomen: soft, non tender  Peripheral: upper extremities warm, pink, symmetrical in size, pulses 2+ regular rhythm, equal  lower extremities warm, pink, symmetrical in size, pulses 2+ regular rhythm, equal  SVE: 0/50/-3  TAS: vertex presentation  EFW 3430 gm by Leopold's    NST reactive with moderate variability, cat 1  toco irregular contractions noted    maternal and fetal status reassuring  d/w with Dr Izzy Henriquez & Dr García  admit l&d  Labor @ 38.6 weeks for presumed cholestasis for IOL PO cytotec   MFM consult approved IOL by Dr Friedman  clear fluids  GBS negative   pre-eclampsia labs pending   see admission orders

## 2020-01-27 NOTE — OB RN PATIENT PROFILE - DURING SKIN TO SKIN, COUNSELING AND EDUCATION ON THE BENEFITS OF EXCLUSIVELY BREASTFEEDING IS REINFORCED.
Quality 110: Preventive Care And Screening: Influenza Immunization: Influenza Immunization Administered during Influenza season
Detail Level: Detailed
Statement Selected

## 2020-01-27 NOTE — OB RN TRIAGE NOTE - PMH
Acute cystitis without hematuria  1/26/2019  Elevated liver enzymes    Hypothyroid  levothyroxine 50 mcg mionday-friday; 100 mcg saturday & sunday  Proteinuria complicating pregnancy

## 2020-01-27 NOTE — OB PROVIDER TRIAGE NOTE - HISTORY OF PRESENT ILLNESS
35 y/o pt 38.6 weeks  sent from Dr Galan's office for possible IOL for presumed cholestasis. pt reports that her hands and feet began itching around 0300. pt was a scheduled IOL for 2020 as per MFM consult on 2020 for protein in urine and elevated liver enzymes in 2019. pt reports the blood pressures have been within normal limits during pregnancy and was instructed to monitor blood pressures 3x daily. pt denies headache, visual disturbances, and epigastric pain. pt denies bleeding & LOF. pt reports mild irregular contractions. pt endorses +fetal movement  AP complicated by proteinuria and elevated liver enzymes; scheduled IOL for 2020    Allergies: pt reports family allergy to SULFA, rash. pt reports never taking it  PMH:  Acute cystitis without hematuria  Hypothyroid  PSH: denies  Ob/Gyn:   TOP x1 incomplete (d&c) 2019  Medications:  PNV  Levothyroxine 50mcg Monday-Friday; 100 mcg Saturday &  35 y/o pt 38.6 weeks  sent from Dr Galan's office for possible IOL for presumed cholestasis. pt reports that her hands and feet began itching around 0300. Bile acids were sent in office today. pt was a scheduled IOL for 2020 as per MFM consult on 2020 for protein in urine and elevated liver enzymes in 2019. pt reports the blood pressures have been within normal limits during pregnancy and was instructed to monitor blood pressures 3x daily. pt denies headache, visual disturbances, and epigastric pain. pt denies bleeding & LOF. pt reports mild irregular contractions. pt endorses +fetal movement  AP complicated by proteinuria and elevated liver enzymes; scheduled IOL for 2020    Allergies: pt reports family allergy to SULFA, rash. pt reports never taking it  PMH:  Acute cystitis without hematuria  Hypothyroid  PSH: denies  Ob/Gyn:   TOP x1 incomplete (d&c) 2019  Medications:  PNV  Levothyroxine 50mcg Monday-Friday; 100 mcg Saturday &  35 y/o pt 38.6 weeks  sent from Dr Galan's office for possible IOL for presumed cholestasis. pt reports that her hands and feet began itching around 0300. Bile acids were sent in office today. pt was a scheduled IOL for 2020 as per MFM consult on 2020 for protein in urine and elevated liver enzymes since 2019. pt reports the blood pressures have been within normal limits during pregnancy and was instructed to monitor blood pressures 3x daily. pt denies headache, visual disturbances, and epigastric pain. pt denies bleeding & LOF. pt reports mild irregular contractions. pt endorses +fetal movement  AP complicated by proteinuria and elevated liver enzymes; scheduled IOL for 2020    Allergies: pt reports family allergy to SULFA, rash. pt reports never taking it  PMH:  Acute cystitis without hematuria  Hypothyroid  PSH: denies  Ob/Gyn:   TOP x1 incomplete (d&c) 2019  Medications:  PNV  Levothyroxine 50mcg Monday-Friday; 100 mcg Saturday &

## 2020-01-27 NOTE — OB PROVIDER H&P - HISTORY OF PRESENT ILLNESS
33 y/o pt 38.6 weeks  sent from Dr Galan's office for possible IOL for presumed cholestasis. pt reports that her hands and feet began itching around 0300. Bile acids were sent in office today. pt was a scheduled IOL for 2020 as per MFM consult on 2020 for protein in urine and elevated liver enzymes since 2019. pt reports blood pressures have been within normal limits during pregnancy and was instructed to monitor blood pressures 3x daily. pt denies headache, visual disturbances, and epigastric pain. pt denies bleeding & LOF. pt reports mild irregular contractions. pt endorses +fetal movement  AP complicated by proteinuria and elevated liver enzymes; scheduled IOL for 2020    Allergies: pt reports family allergy to SULFA, rash. pt reports never taking it  PMH:  Acute cystitis without hematuria  Hypothyroid  PSH: denies  Ob/Gyn:   TOP x1 incomplete (d&c) 2019  Medications:  PNV  Levothyroxine 50mcg Monday-Friday; 100 mcg Saturday &

## 2020-01-28 RX ORDER — OXYTOCIN 10 UNIT/ML
2 VIAL (ML) INJECTION
Qty: 30 | Refills: 0 | Status: DISCONTINUED | OUTPATIENT
Start: 2020-01-28 | End: 2020-01-29

## 2020-01-28 RX ORDER — BUTORPHANOL TARTRATE 2 MG/ML
2 INJECTION, SOLUTION INTRAMUSCULAR; INTRAVENOUS ONCE
Refills: 0 | Status: DISCONTINUED | OUTPATIENT
Start: 2020-01-28 | End: 2020-01-28

## 2020-01-28 RX ADMIN — BUTORPHANOL TARTRATE 2 MILLIGRAM(S): 2 INJECTION, SOLUTION INTRAMUSCULAR; INTRAVENOUS at 03:48

## 2020-01-28 RX ADMIN — Medication 50 MICROGRAM(S): at 08:05

## 2020-01-28 RX ADMIN — BUTORPHANOL TARTRATE 2 MILLIGRAM(S): 2 INJECTION, SOLUTION INTRAMUSCULAR; INTRAVENOUS at 03:18

## 2020-01-28 RX ADMIN — Medication 2 MILLIUNIT(S)/MIN: at 18:02

## 2020-01-28 NOTE — CHART NOTE - NSCHARTNOTEFT_GEN_A_CORE
NP note    Pt seen for increased pressure    Vital Signs Last 24 Hrs  T(C): 36.6 (28 Jan 2020 08:04), Max: 37.0 (27 Jan 2020 19:00)  T(F): 97.88 (28 Jan 2020 08:04), Max: 98.6 (27 Jan 2020 19:00)  HR: 74 (28 Jan 2020 09:21) (60 - 110)  BP: 119/80 (28 Jan 2020 07:48) (97/54 - 138/82)  RR: 16 (27 Jan 2020 11:50) (16 - 16)  SpO2: 96% (28 Jan 2020 09:21) (94% - 99%)    /moderate variability/+ accels/no decels  [place] q2-4min  SVE 2/50/-3 balloon reinflated    -Cont PO cytotec  -Pt will advise whether she wants lower dose of Stadol at 1mg VS epidural    sredalfred, NP

## 2020-01-28 NOTE — CHART NOTE - NSCHARTNOTEFT_GEN_A_CORE
R1 Labor Note    Patient seen and examined at bedside for update to labor curve.    Vital Signs Last 24 Hrs  T(C): 36.8 (28 Jan 2020 16:02), Max: 36.9 (27 Jan 2020 23:00)  T(F): 98.24 (28 Jan 2020 16:02), Max: 98.42 (27 Jan 2020 23:00)  HR: 68 (28 Jan 2020 19:19) (60 - 106)  BP: 110/63 (28 Jan 2020 19:23) (88/53 - 133/64)  BP(mean): --  RR: --  SpO2: 97% (28 Jan 2020 19:23) (94% - 99%)    SVE: cervical balloon removed, 4-5/60/-3, intact  EFM: 140bpm/mod dario/-decels  TOCO: q2-3min    A/P   - Start pitocin     d/w Dr. Mike Wagner, PGY-1

## 2020-01-28 NOTE — CHART NOTE - NSCHARTNOTEFT_GEN_A_CORE
PGY1 Note      Patient feeling more pain.    VS  T(C): 36.6 (20 @ 03:01)  HR: 66 (20 @ 04:32)  BP: 115/62 (20 @ 03:48)  RR: 16 (20 @ 11:50)  SpO2: 99% (20 @ 04:37)    SVE: 9/100/0  FHR: 140/mod dario/-acel/+late decel: cat 2   [place]: ctx 2-3 mins     left lateral position   supplemental O2   anticipate     d/w Dr. Radha Monahan List PGY1

## 2020-01-28 NOTE — CHART NOTE - NSCHARTNOTEFT_GEN_A_CORE
PGY1 Note    Patient seen at bedside for placement of cook balloon; comfortable after stadol.    VS  T(C): 36.9 (01-27-20 @ 23:00)  HR: 69 (01-28-20 @ 03:37)  BP: 125/73 (01-28-20 @ 02:49)  RR: 16 (01-27-20 @ 11:50)  SpO2: 96% (01-28-20 @ 03:42)    SVE: 1/50/-3  FHR: 140/mod dario/-acel/-decel: cat 1  [place]: ctx 2-3 mins    CB in place  Continue with PO cytotec    D/w Dr. Radha Ponce PGY1

## 2020-01-28 NOTE — CHART NOTE - NSCHARTNOTEFT_GEN_A_CORE
Patient seen at bedside   patient complaining of shoulder blade pain. Pain is 8/10 and feels muscular , worsened with moving neck. Denies any headaches, difficulty breathing  VSS   Tracing: category 1   [place]: irregular 2-3/10   VE:4.5/60/-3   Pitocin currently at 4   Continue to increase pitocin   Hot pack to shoulder blade   Continue to monitor Pain

## 2020-01-28 NOTE — CHART NOTE - NSCHARTNOTEFT_GEN_A_CORE
NP note    Pt seen for increased pressure. SP full course of PO Cytotec    Vital Signs Last 24 Hrs  T(C): 36.8 (28 Jan 2020 11:58), Max: 37.0 (27 Jan 2020 19:00)  T(F): 98.24 (28 Jan 2020 11:58), Max: 98.6 (27 Jan 2020 19:00)  HR: 75 (28 Jan 2020 15:12) (60 - 100)  BP: 113/70 (28 Jan 2020 14:57) (97/54 - 125/73)  SpO2: 98% (28 Jan 2020 15:12) (94% - 99%)    EFM Cat I  [place] q2-5min  SVE 3-4/70/-3 cervical balloon reinflated    -Maintain Cervical balloon  -For vaginal Cytotec if cervical balloon remains in place as per Dr. Mckeon  -Anesthesia paged for epidural (Dr. Guzman covering)    harsha, NP

## 2020-01-28 NOTE — CHART NOTE - NSCHARTNOTEFT_GEN_A_CORE
Np note    Called to room to examine pt s/p 4minute prolonged decel with return to baseline after being placed back on monitor from bathroom.  Return to baseline of 130 with moderate variability noted  SVE 3-4/70/-3 Cervical balloon in place  Restart pitocin once 20 minutes Category I tracing established  D/W Dr. Faustin at bedside    BISI mcleod

## 2020-01-29 ENCOUNTER — TRANSCRIPTION ENCOUNTER (OUTPATIENT)
Age: 35
End: 2020-01-29

## 2020-01-29 RX ORDER — KETOROLAC TROMETHAMINE 30 MG/ML
30 SYRINGE (ML) INJECTION ONCE
Refills: 0 | Status: DISCONTINUED | OUTPATIENT
Start: 2020-01-29 | End: 2020-01-29

## 2020-01-29 RX ORDER — TETANUS TOXOID, REDUCED DIPHTHERIA TOXOID AND ACELLULAR PERTUSSIS VACCINE, ADSORBED 5; 2.5; 8; 8; 2.5 [IU]/.5ML; [IU]/.5ML; UG/.5ML; UG/.5ML; UG/.5ML
0.5 SUSPENSION INTRAMUSCULAR ONCE
Refills: 0 | Status: DISCONTINUED | OUTPATIENT
Start: 2020-01-29 | End: 2020-01-31

## 2020-01-29 RX ORDER — SIMETHICONE 80 MG/1
80 TABLET, CHEWABLE ORAL EVERY 4 HOURS
Refills: 0 | Status: DISCONTINUED | OUTPATIENT
Start: 2020-01-29 | End: 2020-01-31

## 2020-01-29 RX ORDER — BENZOCAINE 10 %
1 GEL (GRAM) MUCOUS MEMBRANE EVERY 6 HOURS
Refills: 0 | Status: DISCONTINUED | OUTPATIENT
Start: 2020-01-29 | End: 2020-01-31

## 2020-01-29 RX ORDER — SODIUM CHLORIDE 9 MG/ML
3 INJECTION INTRAMUSCULAR; INTRAVENOUS; SUBCUTANEOUS EVERY 8 HOURS
Refills: 0 | Status: DISCONTINUED | OUTPATIENT
Start: 2020-01-29 | End: 2020-01-31

## 2020-01-29 RX ORDER — DIBUCAINE 1 %
1 OINTMENT (GRAM) RECTAL EVERY 6 HOURS
Refills: 0 | Status: DISCONTINUED | OUTPATIENT
Start: 2020-01-29 | End: 2020-01-31

## 2020-01-29 RX ORDER — PRAMOXINE HYDROCHLORIDE 150 MG/15G
1 AEROSOL, FOAM RECTAL EVERY 4 HOURS
Refills: 0 | Status: DISCONTINUED | OUTPATIENT
Start: 2020-01-29 | End: 2020-01-31

## 2020-01-29 RX ORDER — HYDROCORTISONE 1 %
1 OINTMENT (GRAM) TOPICAL EVERY 6 HOURS
Refills: 0 | Status: DISCONTINUED | OUTPATIENT
Start: 2020-01-29 | End: 2020-01-31

## 2020-01-29 RX ORDER — IBUPROFEN 200 MG
600 TABLET ORAL EVERY 6 HOURS
Refills: 0 | Status: DISCONTINUED | OUTPATIENT
Start: 2020-01-29 | End: 2020-01-31

## 2020-01-29 RX ORDER — OXYCODONE HYDROCHLORIDE 5 MG/1
5 TABLET ORAL ONCE
Refills: 0 | Status: DISCONTINUED | OUTPATIENT
Start: 2020-01-29 | End: 2020-01-31

## 2020-01-29 RX ORDER — AER TRAVELER 0.5 G/1
1 SOLUTION RECTAL; TOPICAL EVERY 4 HOURS
Refills: 0 | Status: DISCONTINUED | OUTPATIENT
Start: 2020-01-29 | End: 2020-01-31

## 2020-01-29 RX ORDER — ACETAMINOPHEN 500 MG
975 TABLET ORAL
Refills: 0 | Status: DISCONTINUED | OUTPATIENT
Start: 2020-01-29 | End: 2020-01-31

## 2020-01-29 RX ORDER — LANOLIN
1 OINTMENT (GRAM) TOPICAL EVERY 6 HOURS
Refills: 0 | Status: DISCONTINUED | OUTPATIENT
Start: 2020-01-29 | End: 2020-01-31

## 2020-01-29 RX ORDER — DIPHENHYDRAMINE HCL 50 MG
25 CAPSULE ORAL EVERY 6 HOURS
Refills: 0 | Status: DISCONTINUED | OUTPATIENT
Start: 2020-01-29 | End: 2020-01-31

## 2020-01-29 RX ORDER — MAGNESIUM HYDROXIDE 400 MG/1
30 TABLET, CHEWABLE ORAL
Refills: 0 | Status: DISCONTINUED | OUTPATIENT
Start: 2020-01-29 | End: 2020-01-31

## 2020-01-29 RX ORDER — SODIUM CHLORIDE 9 MG/ML
500 INJECTION, SOLUTION INTRAVENOUS ONCE
Refills: 0 | Status: COMPLETED | OUTPATIENT
Start: 2020-01-29 | End: 2020-01-29

## 2020-01-29 RX ORDER — OXYTOCIN 10 UNIT/ML
333.33 VIAL (ML) INJECTION
Qty: 20 | Refills: 0 | Status: DISCONTINUED | OUTPATIENT
Start: 2020-01-29 | End: 2020-01-29

## 2020-01-29 RX ORDER — IBUPROFEN 200 MG
600 TABLET ORAL EVERY 6 HOURS
Refills: 0 | Status: COMPLETED | OUTPATIENT
Start: 2020-01-29 | End: 2020-12-27

## 2020-01-29 RX ORDER — GLYCERIN ADULT
1 SUPPOSITORY, RECTAL RECTAL AT BEDTIME
Refills: 0 | Status: DISCONTINUED | OUTPATIENT
Start: 2020-01-29 | End: 2020-01-31

## 2020-01-29 RX ORDER — OXYCODONE HYDROCHLORIDE 5 MG/1
5 TABLET ORAL
Refills: 0 | Status: DISCONTINUED | OUTPATIENT
Start: 2020-01-29 | End: 2020-01-31

## 2020-01-29 RX ADMIN — Medication 30 MILLIGRAM(S): at 18:10

## 2020-01-29 RX ADMIN — SODIUM CHLORIDE 3 MILLILITER(S): 9 INJECTION INTRAMUSCULAR; INTRAVENOUS; SUBCUTANEOUS at 23:07

## 2020-01-29 RX ADMIN — Medication 30 MILLIGRAM(S): at 18:28

## 2020-01-29 RX ADMIN — Medication 975 MILLIGRAM(S): at 22:00

## 2020-01-29 RX ADMIN — Medication 2 MILLIUNIT(S)/MIN: at 07:15

## 2020-01-29 RX ADMIN — SODIUM CHLORIDE 1000 MILLILITER(S): 9 INJECTION, SOLUTION INTRAVENOUS at 11:55

## 2020-01-29 RX ADMIN — Medication 2 MILLIUNIT(S)/MIN: at 06:55

## 2020-01-29 RX ADMIN — Medication 1000 MILLIUNIT(S)/MIN: at 17:41

## 2020-01-29 RX ADMIN — Medication 975 MILLIGRAM(S): at 21:02

## 2020-01-29 RX ADMIN — SODIUM CHLORIDE 125 MILLILITER(S): 9 INJECTION, SOLUTION INTRAVENOUS at 07:15

## 2020-01-29 RX ADMIN — Medication 50 MICROGRAM(S): at 08:52

## 2020-01-29 NOTE — DISCHARGE NOTE OB - MATERIALS PROVIDED
Tdap Vaccination (VIS Pub Date: 2012)/[facility] Hearing Screen Program/[facility]  Screening Program/Breastfeeding Log/Guide to Postpartum Care/  Immunization Record/Vaccinations/Shaken Baby Prevention Handout/Birth Certificate Instructions

## 2020-01-29 NOTE — CHART NOTE - NSCHARTNOTEFT_GEN_A_CORE
Patient seen at bedside   Reports neck pain has improved   Tracing category 1   [place]: 2-3/10 iregular   Ve unchanged   pitocin at 6   Continue to titrate pit   IUPC if unchanged on next exam

## 2020-01-29 NOTE — CHART NOTE - NSCHARTNOTEFT_GEN_A_CORE
pt seen and examined   VE; 4-5/50/-3   AROM performed, fluid clear   IUPC/ISE placed   fht cat 1, previously cat 2 but resolved with resuscitative measures   epidural in place for pain control   ctx q1-3 min on 12 mu pitocin   continue pitocin augmentation

## 2020-01-29 NOTE — DISCHARGE NOTE OB - ADDITIONAL INSTRUCTIONS
call the office this week to schedule a 6 week post partum visit call the office this week to schedule a 1-2 week post partum visit. RPT LFTs, CBC, BP check

## 2020-01-29 NOTE — CHART NOTE - NSCHARTNOTEFT_GEN_A_CORE
Vital Signs Last 24 Hrs  T(C): 36.8 (29 Jan 2020 04:51), Max: 36.8 (28 Jan 2020 11:58)  T(F): 98.24 (29 Jan 2020 04:51), Max: 98.24 (28 Jan 2020 11:58)  HR: 71 (29 Jan 2020 07:23) (56 - 109)  BP: 123/64 (29 Jan 2020 07:19) (88/53 - 137/76)  SpO2: 98% (29 Jan 2020 07:28) (89% - 100%)  SVE: 4/60/-3  150 baseline with moderate variability, accelerations present  CTX: 2 to 3 minutes apart Vital Signs Last 24 Hrs  T(C): 36.8 (29 Jan 2020 04:51), Max: 36.8 (28 Jan 2020 11:58)  T(F): 98.24 (29 Jan 2020 04:51), Max: 98.24 (28 Jan 2020 11:58)  HR: 71 (29 Jan 2020 07:23) (56 - 109)  BP: 123/64 (29 Jan 2020 07:19) (88/53 - 137/76)  SpO2: 98% (29 Jan 2020 07:28) (89% - 100%)  SVE: 4/60/-3  150 baseline with moderate variability, accelerations present  CTX: 2 to 3 minutes apart      Awaiting to discuss with  for IUPC placement

## 2020-01-29 NOTE — OB RN DELIVERY SUMMARY - NS_SEPSISRSKCALC_OBGYN_ALL_OB_FT
EOS calculated successfully. EOS Risk Factor: 0.5/1000 live births ([facility] national incidence); GA=39w1d; Temp=98.96; ROM=9.517; GBS='Negative'; Antibiotics='No antibiotics or any antibiotics < 2 hrs prior to birth' EOS calculated successfully. EOS Risk Factor: 0.5/1000 live births ([facility] national incidence); GA=39w1d; Temp=99.1; ROM=9.517; GBS='Negative'; Antibiotics='No antibiotics or any antibiotics < 2 hrs prior to birth'

## 2020-01-29 NOTE — CHART NOTE - NSCHARTNOTEFT_GEN_A_CORE
PA Note    patient seen & examined for rectal pressure    VS  T(C): 36.9 (01-29-20 @ 09:32)  HR: 94 (01-29-20 @ 10:33)  BP: 137/70 (01-29-20 @ 10:33)  RR: 16 (01-29-20 @ 07:15)  SpO2: 98% (01-29-20 @ 10:38)    140/mod dario/+accels/no decels  [place] q 2-3min on pit at 18 mu with IUPC  VE 4/70/-1    cont efm/toco  cont pitocin  peanut ball  dw Dr Ashu mcintosh pa

## 2020-01-29 NOTE — PROVIDER CONTACT NOTE (OTHER) - BACKGROUND
admitted for IOL for Cholestatis at 38.6 weeks currently at 39.1 weeks. Pt is s/p po cytotec and is currently on pitocin with ISe and IUPC since 738

## 2020-01-29 NOTE — DISCHARGE NOTE OB - PATIENT PORTAL LINK FT
You can access the FollowMyHealth Patient Portal offered by [facility] by registering at the following website: [email or web address]. By joining [employer]’s FollowMyHealth portal, you will also be able to view your health information using other applications (apps) compatible with our system.

## 2020-01-29 NOTE — CHART NOTE - NSCHARTNOTEFT_GEN_A_CORE
pt seen and examined   VE: ant lip/100/-1   ctx q 1-3 min on 20mu pitocin   fht now cat 1, previously cat 2 with min variability, likely sleep cycle   continue pitocin augmentation   resuscitative measures prn cat 2 fht   anticipate

## 2020-01-29 NOTE — LACTATION INITIAL EVALUATION - LACTATION INTERVENTIONS
Infant less than 24 hours. Mom and FOB educated with regard to 1) babies less than 24 hours of age will be sleepy. 2) Made aware of cluster feeding that occurs after 24 hours of life and to be cautious of sleep deprivation in order to maintain infant and mother safety. Instructed to place infant in bassinet or call for assistance if feeling sleepy or tired., 3)  Recognition of feeding cues and to feed the baby on demand based on cues at least 8-12 times in a day. Instructed pt. to wake the baby to feed if no feeding cues are seen within 3h since prior feed. Pt. educated on the nutritional needs of the baby, how many wet and dirty diapers to expect, along with the amount of times the baby needs to be placed on the breast at this time.  4) use  feeding log to record feedings along with wet and dirty diapers. Pt. taught that the use of the breastfeeding log will allow her to visualize what the baby is receiving from breastfeeding by documenting the feeds along with wet and dirty diapers. 5) instructed in hand expression with good return demonstration. + colostrum noted. Pt. informed of interactive learning arelis available to use with the New Beginnings book. Interactive component demonstrated utilizing the section for hand expression and positioning. 6) Reviewed safe skin to skin. Shown wall poster for visual reinforcement of education. Verbalized understanding of education. Pt. informed of availability of lactation through the night and encouraged to call for assistance prn. RN made aware of plan and to assist with further feedings as necessary. Instructed to request assistance prn.

## 2020-01-29 NOTE — DISCHARGE NOTE OB - CARE PROVIDER_API CALL
Agsu Wakefield)  Obstetrics and Gynecology Obstetrics and Gynocology  [address] [place] [address]  Phone: [phone]  Fax: [phone]  Follow Up Time:

## 2020-01-29 NOTE — OB PROVIDER DELIVERY SUMMARY - NS_ROMTYPE_OBGYN_ALL_OB
Follow Up Phone Call    Breastfeeding-yes, mom states Bf going really well    Pumping-yes, pumping and storage guidelines reviewed    ABM Supplementation--no    Wet diapers per day- at least 6/day    Stools per day- every other change(3-4)    Color of Stoo
AROM

## 2020-01-29 NOTE — DISCHARGE NOTE OB - MEDICATION SUMMARY - MEDICATIONS TO TAKE
I will START or STAY ON the medications listed below when I get home from the hospital:    acetaminophen 325 mg oral tablet  -- 3 tab(s) by mouth every 4 to 6 hours, As Needed  -- Indication: For Vaginal delivery    ibuprofen 600 mg oral tablet  -- 1 tab(s) by mouth every 6 to 8 hours, As Needed  -- Indication: For Vaginal delivery    lanolin topical ointment  -- 1 application on skin every 6 hours, As needed, nipple soreness  -- Indication: For Vaginal delivery    witch hazel 50% topical pad  -- 1 application on skin every 4 hours, As needed, Perineal discomfort  -- Indication: For Vaginal delivery

## 2020-01-29 NOTE — PROVIDER CONTACT NOTE (OTHER) - SITUATION
noted to have 5 small blisters on abemen, there has been not monitors on abdomen since 0738 . not blisters noted upon removing monitors this am

## 2020-01-29 NOTE — OB PROVIDER DELIVERY SUMMARY - NSPROVIDERDELIVERYNOTE_OBGYN_ALL_OB_FT
, male, APGARS , weight 8lb7oz  RML episiotomy repaired with vicryl suture   1000mcg cytotec per rectum for atony   ebl 80cc

## 2020-01-30 ENCOUNTER — APPOINTMENT (OUTPATIENT)
Dept: ANTEPARTUM | Facility: HOSPITAL | Age: 35
End: 2020-01-30

## 2020-01-30 ENCOUNTER — APPOINTMENT (OUTPATIENT)
Dept: ANTEPARTUM | Facility: CLINIC | Age: 35
End: 2020-01-30

## 2020-01-30 LAB
ALBUMIN SERPL ELPH-MCNC: 2.4 G/DL — LOW (ref 3.3–5)
ALP SERPL-CCNC: 136 U/L — HIGH (ref 40–120)
ALT FLD-CCNC: 100 U/L — HIGH (ref 4–33)
ANION GAP SERPL CALC-SCNC: 12 MMO/L — SIGNIFICANT CHANGE UP (ref 7–14)
AST SERPL-CCNC: 49 U/L — HIGH (ref 4–32)
BILIRUB SERPL-MCNC: 0.4 MG/DL — SIGNIFICANT CHANGE UP (ref 0.2–1.2)
BUN SERPL-MCNC: 12 MG/DL — SIGNIFICANT CHANGE UP (ref 7–23)
CALCIUM SERPL-MCNC: 8.8 MG/DL — SIGNIFICANT CHANGE UP (ref 8.4–10.5)
CHLORIDE SERPL-SCNC: 106 MMOL/L — SIGNIFICANT CHANGE UP (ref 98–107)
CO2 SERPL-SCNC: 20 MMOL/L — LOW (ref 22–31)
CREAT SERPL-MCNC: 0.91 MG/DL — SIGNIFICANT CHANGE UP (ref 0.5–1.3)
GLUCOSE SERPL-MCNC: 82 MG/DL — SIGNIFICANT CHANGE UP (ref 70–99)
HCT VFR BLD CALC: 25.6 % — LOW (ref 34.5–45)
HGB BLD-MCNC: 8.4 G/DL — LOW (ref 11.5–15.5)
MCHC RBC-ENTMCNC: 30 PG — SIGNIFICANT CHANGE UP (ref 27–34)
MCHC RBC-ENTMCNC: 32.8 % — SIGNIFICANT CHANGE UP (ref 32–36)
MCV RBC AUTO: 91.4 FL — SIGNIFICANT CHANGE UP (ref 80–100)
NRBC # FLD: 0 K/UL — SIGNIFICANT CHANGE UP (ref 0–0)
PLATELET # BLD AUTO: 135 K/UL — LOW (ref 150–400)
PMV BLD: 12.3 FL — SIGNIFICANT CHANGE UP (ref 7–13)
POTASSIUM SERPL-MCNC: 3.8 MMOL/L — SIGNIFICANT CHANGE UP (ref 3.5–5.3)
POTASSIUM SERPL-SCNC: 3.8 MMOL/L — SIGNIFICANT CHANGE UP (ref 3.5–5.3)
PROT SERPL-MCNC: 5.2 G/DL — LOW (ref 6–8.3)
RBC # BLD: 2.8 M/UL — LOW (ref 3.8–5.2)
RBC # FLD: 13.4 % — SIGNIFICANT CHANGE UP (ref 10.3–14.5)
SODIUM SERPL-SCNC: 138 MMOL/L — SIGNIFICANT CHANGE UP (ref 135–145)
WBC # BLD: 13.18 K/UL — HIGH (ref 3.8–10.5)
WBC # FLD AUTO: 13.18 K/UL — HIGH (ref 3.8–10.5)

## 2020-01-30 RX ORDER — ACETAMINOPHEN 500 MG
3 TABLET ORAL
Qty: 0 | Refills: 0 | DISCHARGE
Start: 2020-01-30

## 2020-01-30 RX ORDER — LANOLIN
1 OINTMENT (GRAM) TOPICAL
Qty: 0 | Refills: 0 | DISCHARGE
Start: 2020-01-30

## 2020-01-30 RX ORDER — LEVOTHYROXINE SODIUM 125 MCG
1 TABLET ORAL
Qty: 0 | Refills: 0 | DISCHARGE

## 2020-01-30 RX ORDER — IBUPROFEN 200 MG
1 TABLET ORAL
Qty: 0 | Refills: 0 | DISCHARGE
Start: 2020-01-30

## 2020-01-30 RX ORDER — AER TRAVELER 0.5 G/1
1 SOLUTION RECTAL; TOPICAL
Qty: 0 | Refills: 0 | DISCHARGE
Start: 2020-01-30

## 2020-01-30 RX ADMIN — Medication 975 MILLIGRAM(S): at 10:20

## 2020-01-30 RX ADMIN — Medication 600 MILLIGRAM(S): at 06:41

## 2020-01-30 RX ADMIN — Medication 975 MILLIGRAM(S): at 09:49

## 2020-01-30 RX ADMIN — Medication 600 MILLIGRAM(S): at 17:16

## 2020-01-30 RX ADMIN — Medication 50 MICROGRAM(S): at 05:45

## 2020-01-30 RX ADMIN — Medication 600 MILLIGRAM(S): at 05:42

## 2020-01-30 RX ADMIN — Medication 975 MILLIGRAM(S): at 02:46

## 2020-01-30 RX ADMIN — SODIUM CHLORIDE 3 MILLILITER(S): 9 INJECTION INTRAMUSCULAR; INTRAVENOUS; SUBCUTANEOUS at 05:44

## 2020-01-30 RX ADMIN — SODIUM CHLORIDE 3 MILLILITER(S): 9 INJECTION INTRAMUSCULAR; INTRAVENOUS; SUBCUTANEOUS at 15:00

## 2020-01-30 RX ADMIN — Medication 600 MILLIGRAM(S): at 23:52

## 2020-01-30 RX ADMIN — Medication 600 MILLIGRAM(S): at 18:12

## 2020-01-30 RX ADMIN — Medication 975 MILLIGRAM(S): at 03:05

## 2020-01-30 NOTE — PROGRESS NOTE ADULT - SUBJECTIVE AND OBJECTIVE BOX
INTERVAL HPI/OVERNIGHT EVENTS: Pt seen and examined at bedside.  Doing well, denies complaints. +voiding without difficulty, +ambulation, [place] reg diet. denies heavy lochia     MEDICATIONS  (STANDING):  acetaminophen   Tablet .. 975 milliGRAM(s) Oral <User Schedule>  diphtheria/tetanus/pertussis (acellular) Vaccine (ADAcel) 0.5 milliLiter(s) IntraMuscular once  ibuprofen  Tablet. 600 milliGRAM(s) Oral every 6 hours  levothyroxine 50 MICROGram(s) Oral daily  prenatal multivitamin 1 Tablet(s) Oral daily  sodium chloride 0.9% lock flush 3 milliLiter(s) IV Push every 8 hours    MEDICATIONS  (PRN):  benzocaine 20%/menthol 0.5% Spray 1 Spray(s) Topical every 6 hours PRN for Perineal discomfort  dibucaine 1% Ointment 1 Application(s) Topical every 6 hours PRN Perineal discomfort  diphenhydrAMINE 25 milliGRAM(s) Oral every 6 hours PRN Pruritus  glycerin Suppository - Adult 1 Suppository(s) Rectal at bedtime PRN Constipation  hydrocortisone 1% Cream 1 Application(s) Topical every 6 hours PRN Moderate Pain (4-6)  lanolin Ointment 1 Application(s) Topical every 6 hours PRN nipple soreness  magnesium hydroxide Suspension 30 milliLiter(s) Oral two times a day PRN Constipation  oxyCODONE    IR 5 milliGRAM(s) Oral every 3 hours PRN Moderate to Severe Pain (4-10)  oxyCODONE    IR 5 milliGRAM(s) Oral once PRN Moderate to Severe Pain (4-10)  pramoxine 1%/zinc 5% Cream 1 Application(s) Topical every 4 hours PRN Moderate Pain (4-6)  simethicone 80 milliGRAM(s) Chew every 4 hours PRN Gas  witch hazel Pads 1 Application(s) Topical every 4 hours PRN Perineal discomfort      Vital Signs Last 24 Hrs  T(C): 36.7 (30 Jan 2020 06:22), Max: 37.3 (29 Jan 2020 17:40)  T(F): 98 (30 Jan 2020 06:22), Max: 99.1 (29 Jan 2020 17:40)  HR: 83 (30 Jan 2020 06:22) (55 - 130)  BP: 125/81 (30 Jan 2020 06:22) (98/69 - 189/112)  BP(mean): --  RR: 17 (30 Jan 2020 06:22) (15 - 18)  SpO2: 99% (30 Jan 2020 06:22) (63% - 100%)    PHYSICAL EXAM:    GA: NAD, A+0 x 3  Abd:  soft, nontender, nondistended, no rebound or guarding,   Uterus: Fundus midline; firm  VE: nml lochia    LABS:

## 2020-01-30 NOTE — PROGRESS NOTE ADULT - SUBJECTIVE AND OBJECTIVE BOX
Anesthesia Post-op Note    POD#1 S/P vaginal delivery    Patient is doing well.  OOBAA. Tolerating clears.  Pain is tolerable.  No residual anesthetic issues or complications noted.    Terese Miller CRNA

## 2020-01-31 VITALS
HEART RATE: 77 BPM | RESPIRATION RATE: 18 BRPM | OXYGEN SATURATION: 100 % | TEMPERATURE: 98 F | DIASTOLIC BLOOD PRESSURE: 67 MMHG | SYSTOLIC BLOOD PRESSURE: 110 MMHG

## 2020-01-31 DIAGNOSIS — R74.8 ABNORMAL LEVELS OF OTHER SERUM ENZYMES: ICD-10-CM

## 2020-01-31 DIAGNOSIS — E03.9 HYPOTHYROIDISM, UNSPECIFIED: ICD-10-CM

## 2020-01-31 DIAGNOSIS — O12.10 GESTATIONAL PROTEINURIA, UNSPECIFIED TRIMESTER: ICD-10-CM

## 2020-01-31 RX ORDER — FERROUS SULFATE 325(65) MG
325 TABLET ORAL THREE TIMES A DAY
Refills: 0 | Status: DISCONTINUED | OUTPATIENT
Start: 2020-01-31 | End: 2020-01-31

## 2020-01-31 RX ORDER — ASCORBIC ACID 60 MG
500 TABLET,CHEWABLE ORAL DAILY
Refills: 0 | Status: DISCONTINUED | OUTPATIENT
Start: 2020-01-31 | End: 2020-01-31

## 2020-01-31 RX ADMIN — Medication 50 MICROGRAM(S): at 06:01

## 2020-01-31 RX ADMIN — Medication 600 MILLIGRAM(S): at 07:07

## 2020-01-31 RX ADMIN — Medication 325 MILLIGRAM(S): at 06:01

## 2020-01-31 RX ADMIN — Medication 600 MILLIGRAM(S): at 06:01

## 2020-01-31 RX ADMIN — Medication 600 MILLIGRAM(S): at 00:30

## 2020-01-31 NOTE — PROGRESS NOTE ADULT - SUBJECTIVE AND OBJECTIVE BOX
NP progress Note:    Patient seen at bedside resting comfortably offers no current complaints.  Ambulating and voiding without difficulty.  Passing flatus and tolerating regular diet.  Breast feeding. Bonding well with  Denies HA, CP, SOB, N/V/D, dizziness, palpitations, worsening abdominal pain, worsening vaginal bleeding, or any other concerns.  Pt reports itching has greatly subsided    Vital Signs Last 24 Hrs  T(C): 36.8 (2020 05:28), Max: 36.8 (2020 05:28)  T(F): 98.2 (2020 05:28), Max: 98.2 (2020 05:28)  HR: 77 (2020 05:28) (73 - 77)  BP: 110/67 (2020 05:28) (110/67 - 128/80)  BP(mean): --  RR: 18 (2020 05:28) (17 - 18)  SpO2: 100% (2020 05:28) (100% - 100%)                              8.4    13.18 )-----------( 135      ( 2020 08:13 )             25.6        A/P:  PPD#2 s/p       - Discharge home with instructions  -Follow up in office in 1-2 weeks for rpt LFT"s, BP check. RPT cbc  -Breastfeeding encouraged   - cont PNV, iron daily. Green leafy vegetables. Inc PO hydration  -d/w dr Aruna Rios AGNP-C  [phone]

## 2020-02-13 NOTE — OB RN TRIAGE NOTE - NS ED NOTE AC HIGH RISK COUNTRIES
HPI:  2/13/20,   Time: 1:44 PM         Maryana Meehan is a 43 y.o. female presenting to the ED for vaginal itching, beginning 10 days ago. The complaint has been persistent, mild in severity, and worsened by nothing. Recently treated for UTI. Thinks she has a yeast infection    ROS:   Pertinent positives and negatives are stated within HPI, all other systems reviewed and are negative.  --------------------------------------------- PAST HISTORY ---------------------------------------------  Past Medical History:  has a past medical history of Asthma, Diabetes mellitus ([facility] [address] Hyperlipidemia, and Hypertension. Past Surgical History:  has a past surgical history that includes Total knee arthroplasty (Right); Tonsillectomy and adenoidectomy; and Dental surgery. Social History:  reports that she has been smoking cigars. She has never used smokeless tobacco. She reports current alcohol use. She reports that she does not use drugs. Family History: family history is not on file. The patients home medications have been reviewed. Allergies: Sulfa antibiotics; Diflucan [fluconazole];  Shellfish-derived products; and Tramadol    -------------------------------------------------- RESULTS -------------------------------------------------  All laboratory and radiology results have been personally reviewed by myself   LABS:  Results for orders placed or performed during the hospital encounter of 02/13/20   Urinalysis   Result Value Ref Range    Color, UA Yellow Straw/Yellow    Clarity, UA SL CLOUDY Clear    Glucose, Ur Negative Negative mg/dL    Bilirubin Urine Negative Negative    Ketones, Urine Negative Negative mg/dL    Specific Gravity, UA >=1.030 1.005 - 1.030    Blood, Urine SMALL (A) Negative    pH, UA 5.5 5.0 - 9.0    Protein, UA Negative Negative mg/dL    Urobilinogen, Urine 0.2 <2.0 E.U./dL    Nitrite, Urine Negative Negative    Leukocyte Esterase, Urine LARGE (A) Negative   Microscopic No Urinalysis   Result Value Ref Range    WBC, UA >20 0 - 5 /HPF    RBC, UA 5-10 (A) 0 - 2 /HPF    Epi Cells MODERATE /HPF    Bacteria, UA MODERATE (A) None Seen /HPF    Trichomonas, UA Present (A) None Seen /HPF       RADIOLOGY:  Interpreted by Radiologist.  No orders to display       ------------------------- NURSING NOTES AND VITALS REVIEWED ---------------------------   The nursing notes within the ED encounter and vital signs as below have been reviewed. BP (!) 130/93   Pulse 66   Temp 96.8 °F (36 °C) (Oral)   Resp 16   Ht 5' 11\" (1.803 m)   Wt 275 lb (124.7 kg)   LMP 11/26/2019   SpO2 99%   BMI 38.35 kg/m²   Oxygen Saturation Interpretation: Normal      ---------------------------------------------------PHYSICAL EXAM--------------------------------------      Constitutional/General: Alert and oriented x3, well appearing, non toxic in NAD  Head: NC/AT  Eyes: PERRL, EOMI  Mouth: Oropharynx clear, handling secretions, no trismus  Neck: Supple, full ROM, no meningeal signs  Pulmonary: Lungs clear to auscultation bilaterally, no wheezes, rales, or rhonchi. Not in respiratory distress  Cardiovascular:  Regular rate and rhythm, no murmurs, gallops, or rubs. 2+ distal pulses  Abdomen: Soft, non tender, non distended,   Extremities: Moves all extremities x 4. Warm and well perfused  Skin: warm and dry without rash  Neurologic: GCS 15,  Psych: Normal Affect      ------------------------------ ED COURSE/MEDICAL DECISION MAKING----------------------  Medications - No data to display      Medical Decision Making:    Results explained to the patient. She was treated for Trichomoniasis on 1-20-20 and denies any sexual intercourse after that time. Advised to see OBGYN.      Patient's Medications   New Prescriptions    METRONIDAZOLE (FLAGYL) 500 MG TABLET    Take 1 tablet by mouth 2 times daily for 7 days   Previous Medications    BLOOD GLUCOSE TEST STRIPS (ASCENSIA AUTODISC VI;ONE TOUCH ULTRA TEST VI) STRIP    PRN FERROUS SULFATE (IRON SUPPLEMENT PO)    Take by mouth    METFORMIN (GLUCOPHAGE) 500 MG TABLET    Take 1 tablet by mouth 2 times daily (with meals)    NAPROXEN (NAPROSYN) 500 MG TABLET    Take 1 tablet by mouth 2 times daily as needed for Pain    UNKNOWN TO PATIENT    cholestrol med    UNKNOWN TO PATIENT    Indications: BP medication Pt does not know the name    Modified Medications    No medications on file   Discontinued Medications    No medications on file         Counseling: The emergency provider has spoken with the patient and discussed todays results, in addition to providing specific details for the plan of care and counseling regarding the diagnosis and prognosis. Questions are answered at this time and they are agreeable with the plan.      --------------------------------- IMPRESSION AND DISPOSITION ---------------------------------    IMPRESSION  1.  Trichomoniasis        DISPOSITION  Disposition: Discharge to home  Patient condition is good                  Pk Garay MD  02/13/20 1346

## 2020-03-08 NOTE — CHART NOTE - NSCHARTNOTESELECT_GEN_ALL_CORE
Event Note/Labor Skin normal color for race, warm, dry and intact. No evidence of rash. +healed scar on belly

## 2021-08-09 PROBLEM — E03.9 HYPOTHYROIDISM, UNSPECIFIED: Chronic | Status: ACTIVE | Noted: 2020-01-27

## 2021-08-25 ENCOUNTER — APPOINTMENT (OUTPATIENT)
Dept: ENDOCRINOLOGY | Facility: CLINIC | Age: 36
End: 2021-08-25
Payer: MEDICAID

## 2021-08-25 ENCOUNTER — NON-APPOINTMENT (OUTPATIENT)
Age: 36
End: 2021-08-25

## 2021-08-25 VITALS
TEMPERATURE: 96.8 F | HEART RATE: 82 BPM | DIASTOLIC BLOOD PRESSURE: 74 MMHG | BODY MASS INDEX: 27.82 KG/M2 | SYSTOLIC BLOOD PRESSURE: 114 MMHG | WEIGHT: 167 LBS | HEIGHT: 65 IN

## 2021-08-25 DIAGNOSIS — E03.9 HYPOTHYROIDISM, UNSPECIFIED: ICD-10-CM

## 2021-08-25 PROCEDURE — 99204 OFFICE O/P NEW MOD 45 MIN: CPT

## 2021-08-25 RX ORDER — MISOPROSTOL 200 UG/1
200 TABLET ORAL
Qty: 2 | Refills: 0 | Status: DISCONTINUED | COMMUNITY
Start: 2019-03-04 | End: 2021-08-25

## 2021-08-25 NOTE — HISTORY OF PRESENT ILLNESS
[FreeTextEntry1] : Patient is a 37 yo woman 11+ weeks gestation establishing endocrine care for hypothyroidism in pregnancy.\par \par This is the patient's third pregnancy. First pregnancy, thyroid levels were abnormal but pregnancy ended before establishing care with an endocrinologist.  During the second pregnancy, patient also had abnormal levels and was managed by an endocrinologist at [facility]-cannot recall his name at this time.  The endocrinologist no longer takes her insurance.  Currently 11.5 weeks pregnant.  During the second pregnancy, she was started on Lt4 25 mcg, titrated to 50 mcg and then for the last two weeks was on 100 mcg.  Post-partum the patient was weaned off of thyroid medicines.  This pregnancy is a natural conception. 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

## 2021-08-25 NOTE — REASON FOR VISIT
[Initial Evaluation] : an initial evaluation [Hypothyroidism] : hypothyroidism [FreeTextEntry2] : Adrianna Menezes, NP [facility]

## 2021-08-25 NOTE — ASSESSMENT
[FreeTextEntry1] : Patient is a 37 yo woman at 11 weeks 3 days gestation here to establish endocrine care for hypothyroidism in pregnancy\par \par 1. Hypothyroid in pregnancy\par -patient had TSH done August 5 and it was above goal at 4.47. Her previous pregnancies required medications as well to optimize TSH for conception goals\par -has some fatigue but no significant morning sickness\par -repeat thyroid labs, anticipate started LT4 for the duration of this pregnancy\par -no goiter on exam, no significant nodules on palpation\par -no significant family hx of thyroid disease\par -repeat labs in 4 weeks, referral were printed and given to the patient today\par -labs from August were reviewed and scanned into the records\par -TSH goals < 2.5 in first trimester,  <3.0 in second and third trimester\par \par Follow up in 2 months, sooner if needed

## 2021-08-25 NOTE — PHYSICAL EXAM
[Alert] : alert [Well Nourished] : well nourished [No Acute Distress] : no acute distress [EOMI] : extra ocular movement intact [Normal Hearing] : hearing was normal [Thyroid Not Enlarged] : the thyroid was not enlarged [No Respiratory Distress] : no respiratory distress [No Accessory Muscle Use] : no accessory muscle use [Clear to Auscultation] : lungs were clear to auscultation bilaterally [Normal S1, S2] : normal S1 and S2 [Normal Rate] : heart rate was normal [Normal Bowel Sounds] : normal bowel sounds [Not Tender] : non-tender [Soft] : abdomen soft [Normal Gait] : normal gait [No Rash] : no rash [No Motor Deficits] : the motor exam was normal [No Tremors] : no tremors [Normal Affect] : the affect was normal [Normal Insight/Judgement] : insight and judgment were intact [Normal Mood] : the mood was normal

## 2021-08-25 NOTE — CONSULT LETTER
[Consult Letter:] : I had the pleasure of evaluating your patient, [unfilled]. [Please see my note below.] : Please see my note below. [Consult Closing:] : Thank you very much for allowing me to participate in the care of this patient.  If you have any questions, please do not hesitate to contact me. [Sincerely,] : Sincerely, [Dear  ___] : Dear ~ASYA, [FreeTextEntry3] : Samantha Carroll MD

## 2021-08-25 NOTE — REVIEW OF SYSTEMS
[Fatigue] : fatigue [Decreased Appetite] : appetite not decreased [Chest Pain] : no chest pain [Palpitations] : no palpitations [Shortness Of Breath] : no shortness of breath [Nausea] : no nausea [Constipation] : no constipation [Vomiting] : no vomiting [Diarrhea] : no diarrhea [Depression] : no depression

## 2021-08-26 LAB
T3 SERPL-MCNC: 111 NG/DL
T4 FREE SERPL-MCNC: 0.9 NG/DL
THYROGLOB AB SERPL-ACNC: 124 IU/ML
THYROPEROXIDASE AB SERPL IA-ACNC: 14.8 IU/ML
TSH SERPL-ACNC: 3.15 UIU/ML

## 2021-09-01 ENCOUNTER — APPOINTMENT (OUTPATIENT)
Dept: ANTEPARTUM | Facility: CLINIC | Age: 36
End: 2021-09-01
Payer: MEDICAID

## 2021-09-01 ENCOUNTER — ASOB RESULT (OUTPATIENT)
Age: 36
End: 2021-09-01

## 2021-09-01 PROCEDURE — 76801 OB US < 14 WKS SINGLE FETUS: CPT

## 2021-09-01 PROCEDURE — 76813 OB US NUCHAL MEAS 1 GEST: CPT

## 2021-09-13 ENCOUNTER — TRANSCRIPTION ENCOUNTER (OUTPATIENT)
Age: 36
End: 2021-09-13

## 2021-09-14 ENCOUNTER — APPOINTMENT (OUTPATIENT)
Dept: PEDIATRIC MEDICAL GENETICS | Facility: CLINIC | Age: 36
End: 2021-09-14

## 2021-09-14 ENCOUNTER — NON-APPOINTMENT (OUTPATIENT)
Age: 36
End: 2021-09-14

## 2021-09-22 ENCOUNTER — TRANSCRIPTION ENCOUNTER (OUTPATIENT)
Age: 36
End: 2021-09-22

## 2021-10-25 ENCOUNTER — APPOINTMENT (OUTPATIENT)
Dept: ENDOCRINOLOGY | Facility: CLINIC | Age: 36
End: 2021-10-25

## 2021-10-27 ENCOUNTER — APPOINTMENT (OUTPATIENT)
Dept: ANTEPARTUM | Facility: CLINIC | Age: 36
End: 2021-10-27
Payer: MEDICAID

## 2021-10-27 ENCOUNTER — ASOB RESULT (OUTPATIENT)
Age: 36
End: 2021-10-27

## 2021-10-27 PROCEDURE — 76811 OB US DETAILED SNGL FETUS: CPT

## 2021-11-05 NOTE — PROGRESS NOTE ADULT - ASSESSMENT
Bedside and Verbal shift change report given to SOO Hui RN (oncoming nurse) by KRIS Hull RN (offgoing nurse). Report included the following information SBAR, Kardex, Intake/Output, MAR and Recent Results. Physical Exam:     Gen: A&Ox 3, NAD  Chest: CTA B/L  Cardiac: S1,S2; RRR  Breast: Soft, nontender, nonengorged  Abdomen: +BS; Soft, nontender, ND; Fundus firm below umbilicus  Gyn: Min lochia  Ext: Nontender, DTRS 2+, no worsening edema. Mild nonpitting ankle edema, Denies pain w/ ambulation bilaterally

## 2021-11-08 ENCOUNTER — APPOINTMENT (OUTPATIENT)
Dept: ENDOCRINOLOGY | Facility: CLINIC | Age: 36
End: 2021-11-08
Payer: MEDICAID

## 2021-11-08 VITALS
DIASTOLIC BLOOD PRESSURE: 74 MMHG | HEIGHT: 65 IN | TEMPERATURE: 97.3 F | WEIGHT: 180 LBS | SYSTOLIC BLOOD PRESSURE: 111 MMHG | HEART RATE: 81 BPM | BODY MASS INDEX: 29.99 KG/M2

## 2021-11-08 DIAGNOSIS — E03.9 ENDOCRINE, NUTRITIONAL AND METABOLIC DISEASES COMPLICATING PREGNANCY, UNSPECIFIED TRIMESTER: ICD-10-CM

## 2021-11-08 DIAGNOSIS — O99.280 ENDOCRINE, NUTRITIONAL AND METABOLIC DISEASES COMPLICATING PREGNANCY, UNSPECIFIED TRIMESTER: ICD-10-CM

## 2021-11-08 PROCEDURE — 99213 OFFICE O/P EST LOW 20 MIN: CPT

## 2021-11-08 NOTE — PHYSICAL EXAM
[Alert] : alert [Well Nourished] : well nourished [No Acute Distress] : no acute distress [EOMI] : extra ocular movement intact [Normal Hearing] : hearing was normal [Thyroid Not Enlarged] : the thyroid was not enlarged [No Respiratory Distress] : no respiratory distress [No Accessory Muscle Use] : no accessory muscle use [Clear to Auscultation] : lungs were clear to auscultation bilaterally [Normal S1, S2] : normal S1 and S2 [Normal Rate] : heart rate was normal [Normal Bowel Sounds] : normal bowel sounds [Soft] : abdomen soft [Normal Gait] : normal gait [No Rash] : no rash [No Motor Deficits] : the motor exam was normal [No Tremors] : no tremors [Normal Affect] : the affect was normal [Normal Insight/Judgement] : insight and judgment were intact [Normal Mood] : the mood was normal [de-identified] : gravid

## 2021-11-08 NOTE — ASSESSMENT
[FreeTextEntry1] : Patient is a 35 yo woman 22 weeks gestation here for hypothyroid in pregnancy\par \par 1. Hypothyroidism in pregnancy\par -patient with no interval symptoms, feels well \par -pregnancy progressing without any complications at this time\par -states adherence to Lt4 50 mcg po daily. Missed one day last week due to moving but otherwise takes daily\par -clinically euthyroid\par -repeat TFTs today, goal TSH of < 3.0; adjust Lt4 based on results\par -the patient will get another TSH level checked in third trimester with GYN and provide me with those results as she is commuting from [place] \par -discussed that post-partum she will require follow up in 1-2 months to adjust levothyroxine and potentially taper off as she did with second pregnancy\par \par Follow up 1-2 months post-partum, follow up in third trimester with a TSH that can be checked by GYN or welcome to return here.

## 2021-11-08 NOTE — HISTORY OF PRESENT ILLNESS
[FreeTextEntry1] : Patient is a 35 yo woman 22 weeks gestation here for hypothyroidism in pregnancy.\par \par This is the patient's third pregnancy. First pregnancy, thyroid levels were abnormal but pregnancy ended before establishing care with an endocrinologist. During the second pregnancy, patient also had abnormal levels and was managed by an endocrinologist at [facility]-cannot recall his name at this time. The endocrinologist no longer takes her insurance. During the second pregnancy, she was started on Lt4 25 mcg, titrated to 50 mcg and then for the last two weeks was on 100 mcg. Post-partum the patient was weaned off of thyroid medicines. This pregnancy is a natural conception. 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  Levothyroxine 25 mcg po was restarted and titrated to 50 mcg to obtain a goal TSH of < 2.5-3.0 (based on trimester).  Patient states adherence to medicine (missed one day during a home move).  Feels well

## 2021-11-08 NOTE — REVIEW OF SYSTEMS
[Fatigue] : no fatigue [Chest Pain] : no chest pain [Shortness Of Breath] : no shortness of breath [Nausea] : no nausea [Constipation] : no constipation [Vomiting] : no vomiting [Diarrhea] : no diarrhea

## 2021-11-09 LAB
T4 FREE SERPL-MCNC: 1 NG/DL
THYROGLOB AB SERPL-ACNC: 60.2 IU/ML
THYROPEROXIDASE AB SERPL IA-ACNC: <10 IU/ML
TSH SERPL-ACNC: 2.28 UIU/ML

## 2021-11-22 NOTE — BRIEF OPERATIVE NOTE - PRE-OP
<<-----Click on this checkbox to enter Pre-Op Dx
<<-----Click on this checkbox to enter Pre-Op Dx
98

## 2022-02-01 ENCOUNTER — TRANSCRIPTION ENCOUNTER (OUTPATIENT)
Age: 37
End: 2022-02-01

## 2022-02-01 ENCOUNTER — OUTPATIENT (OUTPATIENT)
Dept: INPATIENT UNIT | Facility: HOSPITAL | Age: 37
LOS: 1 days | Discharge: HOME | End: 2022-02-01
Payer: MEDICAID

## 2022-02-01 VITALS
SYSTOLIC BLOOD PRESSURE: 126 MMHG | RESPIRATION RATE: 16 BRPM | DIASTOLIC BLOOD PRESSURE: 83 MMHG | TEMPERATURE: 99 F | HEART RATE: 90 BPM

## 2022-02-01 VITALS — SYSTOLIC BLOOD PRESSURE: 134 MMHG | HEART RATE: 83 BPM | DIASTOLIC BLOOD PRESSURE: 61 MMHG

## 2022-02-01 DIAGNOSIS — Z98.890 OTHER SPECIFIED POSTPROCEDURAL STATES: Chronic | ICD-10-CM

## 2022-02-01 DIAGNOSIS — O26.899 OTHER SPECIFIED PREGNANCY RELATED CONDITIONS, UNSPECIFIED TRIMESTER: ICD-10-CM

## 2022-02-01 DIAGNOSIS — Z3A.00 WEEKS OF GESTATION OF PREGNANCY NOT SPECIFIED: ICD-10-CM

## 2022-02-01 PROCEDURE — 99213 OFFICE O/P EST LOW 20 MIN: CPT

## 2022-02-01 PROCEDURE — 76818 FETAL BIOPHYS PROFILE W/NST: CPT | Mod: 26

## 2022-02-01 RX ORDER — LEVOTHYROXINE SODIUM 0.05 MG/1
50 TABLET ORAL DAILY
Qty: 1 | Refills: 2 | Status: ACTIVE | COMMUNITY
Start: 2021-08-26 | End: 1900-01-01

## 2022-02-01 NOTE — OB PROVIDER TRIAGE NOTE - NSHPPHYSICALEXAM_GEN_ALL_CORE
Vital Signs Last 24 Hrs  T(C): 37 (01 Feb 2022 09:51), Max: 37 (01 Feb 2022 09:51)  T(F): 98.6 (01 Feb 2022 09:51), Max: 98.6 (01 Feb 2022 09:51)  HR: 90 (01 Feb 2022 09:56) (90 - 90)  BP: 126/83 (01 Feb 2022 09:56) (126/83 - 126/83)  BP(mean): --  RR: 16 (01 Feb 2022 09:51) (16 - 16)  SpO2: --    A&O x3  CTAB  abdomen: gravid soft, nontender Vital Signs Last 24 Hrs  T(C): 37 (01 Feb 2022 09:51), Max: 37 (01 Feb 2022 09:51)  T(F): 98.6 (01 Feb 2022 09:51), Max: 98.6 (01 Feb 2022 09:51)  HR: 90 (01 Feb 2022 09:56) (90 - 90)  BP: 126/83 (01 Feb 2022 09:56) (126/83 - 126/83)  BP(mean): --  RR: 16 (01 Feb 2022 09:51) (16 - 16)  SpO2: --    A&O x3  CTAB  abdomen: gravid soft, nontender  TAS: vtx, posterior placenta, bpp 8/8, mushtaq 19.46  NST: 150 baseline, moderate variability, positive accels, no decels, no contractions

## 2022-02-01 NOTE — OB PROVIDER TRIAGE NOTE - NSOBPROVIDERNOTE_OBGYN_ALL_OB_FT
nst initiated nst initiated      patient presented to Dr Galan  Reactive NST, BPP wnl  patient reports + GFM   labor precautions reviewed  continue fetal kick counts, rest and activity as tolerated, increase PO fluid  follow up with primary OB as scheduled  pt verbalized understanding of instructions given  discharged home

## 2022-02-01 NOTE — OB RN TRIAGE NOTE - FALL HARM RISK - UNIVERSAL INTERVENTIONS
Bed in lowest position, wheels locked, appropriate side rails in place/Call bell, personal items and telephone in reach/Instruct patient to call for assistance before getting out of bed or chair/Non-slip footwear when patient is out of bed/[employer] to call system/Physically safe environment - no spills, clutter or unnecessary equipment/Purposeful Proactive Rounding/Room/bathroom lighting operational, light cord in reach

## 2022-02-01 NOTE — OB PROVIDER TRIAGE NOTE - ADDITIONAL INSTRUCTIONS
patient presented to Dr Galan  Reactive NST, BPP wnl  patient reports + GFM   labor precautions reviewed  continue fetal kick counts, rest and activity as tolerated, increase PO fluid  follow up with primary OB as scheduled  pt verbalized understanding of instructions given  discharged home

## 2022-02-01 NOTE — OB PROVIDER TRIAGE NOTE - HISTORY OF PRESENT ILLNESS
This is a 36 year old  at 34.2 weeks gestational age presents from primary OB's office for prolong monitoring for nonreactive NST. (NST not faxed or sent with patient). reports +GFM, denies LOF, VB or contractions.  AP course complicated by:  - cholestasis, diagnosed 1 week ago, started on Ursodial 250 mg    med: hypothyroid  surg:  D&C  gyn: sab x 1 D&C  OB: 2020 NSD M 8#7  current meds: pnv, ursodial 250  mg, levothyroxine 50 mcg monday-friday, 100 mcg -sat

## 2022-02-01 NOTE — OB RN TRIAGE NOTE - NSICDXPASTMEDICALHX_GEN_ALL_CORE_FT
PAST MEDICAL HISTORY:  Acute cystitis without hematuria 1/26/2019    Elevated liver enzymes     Hypothyroid levothyroxine 50 mcg mionday-friday; 100 mcg saturday & sunday    Proteinuria complicating pregnancy

## 2022-02-04 ENCOUNTER — TRANSCRIPTION ENCOUNTER (OUTPATIENT)
Age: 37
End: 2022-02-04

## 2022-02-07 ENCOUNTER — TRANSCRIPTION ENCOUNTER (OUTPATIENT)
Age: 37
End: 2022-02-07

## 2022-02-08 ENCOUNTER — APPOINTMENT (OUTPATIENT)
Dept: ANTEPARTUM | Facility: CLINIC | Age: 37
End: 2022-02-08
Payer: COMMERCIAL

## 2022-02-08 ENCOUNTER — ASOB RESULT (OUTPATIENT)
Age: 37
End: 2022-02-08

## 2022-02-08 PROCEDURE — 76818 FETAL BIOPHYS PROFILE W/NST: CPT

## 2022-02-08 PROCEDURE — 76816 OB US FOLLOW-UP PER FETUS: CPT

## 2022-02-09 ENCOUNTER — APPOINTMENT (OUTPATIENT)
Dept: MATERNAL FETAL MEDICINE | Facility: CLINIC | Age: 37
End: 2022-02-09
Payer: MEDICAID

## 2022-02-09 ENCOUNTER — ASOB RESULT (OUTPATIENT)
Age: 37
End: 2022-02-09

## 2022-02-09 PROCEDURE — 99203 OFFICE O/P NEW LOW 30 MIN: CPT | Mod: TH,95

## 2022-03-03 ENCOUNTER — OUTPATIENT (OUTPATIENT)
Dept: INPATIENT UNIT | Facility: HOSPITAL | Age: 37
LOS: 1 days | Discharge: HOME | End: 2022-03-03

## 2022-03-03 ENCOUNTER — APPOINTMENT (OUTPATIENT)
Dept: ANTEPARTUM | Facility: CLINIC | Age: 37
End: 2022-03-03

## 2022-03-03 VITALS
HEART RATE: 100 BPM | TEMPERATURE: 98 F | DIASTOLIC BLOOD PRESSURE: 75 MMHG | RESPIRATION RATE: 17 BRPM | SYSTOLIC BLOOD PRESSURE: 113 MMHG

## 2022-03-03 VITALS — SYSTOLIC BLOOD PRESSURE: 91 MMHG | HEART RATE: 83 BPM | DIASTOLIC BLOOD PRESSURE: 56 MMHG

## 2022-03-03 DIAGNOSIS — O26.899 OTHER SPECIFIED PREGNANCY RELATED CONDITIONS, UNSPECIFIED TRIMESTER: ICD-10-CM

## 2022-03-03 DIAGNOSIS — Z3A.00 WEEKS OF GESTATION OF PREGNANCY NOT SPECIFIED: ICD-10-CM

## 2022-03-03 DIAGNOSIS — Z98.890 OTHER SPECIFIED POSTPROCEDURAL STATES: Chronic | ICD-10-CM

## 2022-03-03 NOTE — OB RN TRIAGE NOTE - FALL HARM RISK - UNIVERSAL INTERVENTIONS
Bed in lowest position, wheels locked, appropriate side rails in place/Call bell, personal items and telephone in reach/Instruct patient to call for assistance before getting out of bed or chair/Non-slip footwear when patient is out of bed/[place] to call system/Physically safe environment - no spills, clutter or unnecessary equipment/Purposeful Proactive Rounding/Room/bathroom lighting operational, light cord in reach

## 2022-03-03 NOTE — OB PROVIDER TRIAGE NOTE - NSHPPHYSICALEXAM_GEN_ALL_CORE
vital signs BP 98/63, HR 85, T36.9  Abdomen soft, nontender on palpation  FHR baseline 140, moderate variability with acceleartions, no decelerations  TOCO no contractions vital signs BP 98/63, HR 85, T36.9  Abdomen soft, nontender on palpation  FHR baseline 140, moderate variability with accelerations, no decelerations  TOCO no contractions

## 2022-03-03 NOTE — OB PROVIDER TRIAGE NOTE - PLAN OF CARE
f/u with MD as scheduled  Labor precautions discussed with patient  increase fluid intake  IOL scheduled for 3/6/22

## 2022-03-03 NOTE — OB PROVIDER TRIAGE NOTE - HISTORY OF PRESENT ILLNESS
Dr. Valdovinos's 36 year old patient is a  EDC 2022 EGA 38 4/7 weeks sent from MD office for nonreassuring FH tracing in the office with a possible variable. Patient denies contractions, reports + fetal movement, denies LOF, VB. GBS negative as per patient (from2 weeks ago, GBS was done again today)    AP complications: gestational hypothyroidism, patient takes 50 mcg synthroid daily  cholestatis, takes ursodiol daily  elevated liver ennzymes - 22 were 35 and 116  polyhydramnios (NATALY today 22 MVP 8.5)  Medical history: denies  Surgical history: D&C    OBGYN history: D&C20

## 2022-03-03 NOTE — OB PROVIDER TRIAGE NOTE - NSOBPROVIDERNOTE_OBGYN_ALL_OB_FT
Category 1 FH tracing  pt is comfortable  No evidence of maternal or fetal distress.  Patient's history and exam discussed with Dr. Weaver.   Patient cleared for discharge home.   Plan for scheduled IOL on 3/6/22

## 2022-03-06 ENCOUNTER — INPATIENT (INPATIENT)
Facility: HOSPITAL | Age: 37
LOS: 1 days | Discharge: HOME | End: 2022-03-08
Attending: STUDENT IN AN ORGANIZED HEALTH CARE EDUCATION/TRAINING PROGRAM | Admitting: STUDENT IN AN ORGANIZED HEALTH CARE EDUCATION/TRAINING PROGRAM

## 2022-03-06 VITALS — TEMPERATURE: 98 F

## 2022-03-06 DIAGNOSIS — O40.9XX0 POLYHYDRAMNIOS, UNSPECIFIED TRIMESTER, NOT APPLICABLE OR UNSPECIFIED: ICD-10-CM

## 2022-03-06 DIAGNOSIS — Z98.890 OTHER SPECIFIED POSTPROCEDURAL STATES: Chronic | ICD-10-CM

## 2022-03-06 LAB
ALBUMIN SERPL ELPH-MCNC: 3.5 G/DL — SIGNIFICANT CHANGE UP (ref 3.3–5)
ALP SERPL-CCNC: 183 U/L — HIGH (ref 40–120)
ALT FLD-CCNC: 83 U/L — HIGH (ref 4–33)
ANION GAP SERPL CALC-SCNC: 14 MMOL/L — SIGNIFICANT CHANGE UP (ref 7–14)
AST SERPL-CCNC: 34 U/L — HIGH (ref 4–32)
BASOPHILS # BLD AUTO: 0.03 K/UL — SIGNIFICANT CHANGE UP (ref 0–0.2)
BASOPHILS NFR BLD AUTO: 0.3 % — SIGNIFICANT CHANGE UP (ref 0–2)
BILIRUB SERPL-MCNC: 0.2 MG/DL — SIGNIFICANT CHANGE UP (ref 0.2–1.2)
BLD GP AB SCN SERPL QL: NEGATIVE — SIGNIFICANT CHANGE UP
BUN SERPL-MCNC: 10 MG/DL — SIGNIFICANT CHANGE UP (ref 7–23)
CALCIUM SERPL-MCNC: 9.5 MG/DL — SIGNIFICANT CHANGE UP (ref 8.4–10.5)
CHLORIDE SERPL-SCNC: 107 MMOL/L — SIGNIFICANT CHANGE UP (ref 98–107)
CO2 SERPL-SCNC: 17 MMOL/L — LOW (ref 22–31)
COVID-19 SPIKE DOMAIN AB INTERP: POSITIVE
COVID-19 SPIKE DOMAIN ANTIBODY RESULT: >250 U/ML — HIGH
CREAT SERPL-MCNC: 0.6 MG/DL — SIGNIFICANT CHANGE UP (ref 0.5–1.3)
EGFR: 118 ML/MIN/1.73M2 — SIGNIFICANT CHANGE UP
EOSINOPHIL # BLD AUTO: 0.03 K/UL — SIGNIFICANT CHANGE UP (ref 0–0.5)
EOSINOPHIL NFR BLD AUTO: 0.3 % — SIGNIFICANT CHANGE UP (ref 0–6)
GLUCOSE SERPL-MCNC: 92 MG/DL — SIGNIFICANT CHANGE UP (ref 70–99)
HCT VFR BLD CALC: 37.8 % — SIGNIFICANT CHANGE UP (ref 34.5–45)
HGB BLD-MCNC: 12.8 G/DL — SIGNIFICANT CHANGE UP (ref 11.5–15.5)
IANC: 7.55 K/UL — SIGNIFICANT CHANGE UP (ref 1.5–8.5)
IMM GRANULOCYTES NFR BLD AUTO: 1.1 % — SIGNIFICANT CHANGE UP (ref 0–1.5)
LYMPHOCYTES # BLD AUTO: 1.4 K/UL — SIGNIFICANT CHANGE UP (ref 1–3.3)
LYMPHOCYTES # BLD AUTO: 14.4 % — SIGNIFICANT CHANGE UP (ref 13–44)
MCHC RBC-ENTMCNC: 30.3 PG — SIGNIFICANT CHANGE UP (ref 27–34)
MCHC RBC-ENTMCNC: 33.9 GM/DL — SIGNIFICANT CHANGE UP (ref 32–36)
MCV RBC AUTO: 89.6 FL — SIGNIFICANT CHANGE UP (ref 80–100)
MONOCYTES # BLD AUTO: 0.57 K/UL — SIGNIFICANT CHANGE UP (ref 0–0.9)
MONOCYTES NFR BLD AUTO: 5.9 % — SIGNIFICANT CHANGE UP (ref 2–14)
NEUTROPHILS # BLD AUTO: 7.55 K/UL — HIGH (ref 1.8–7.4)
NEUTROPHILS NFR BLD AUTO: 78 % — HIGH (ref 43–77)
NRBC # BLD: 0 /100 WBCS — SIGNIFICANT CHANGE UP
NRBC # FLD: 0 K/UL — SIGNIFICANT CHANGE UP
PLATELET # BLD AUTO: 195 K/UL — SIGNIFICANT CHANGE UP (ref 150–400)
POTASSIUM SERPL-MCNC: 3.7 MMOL/L — SIGNIFICANT CHANGE UP (ref 3.5–5.3)
POTASSIUM SERPL-SCNC: 3.7 MMOL/L — SIGNIFICANT CHANGE UP (ref 3.5–5.3)
PROT SERPL-MCNC: 6.3 G/DL — SIGNIFICANT CHANGE UP (ref 6–8.3)
RBC # BLD: 4.22 M/UL — SIGNIFICANT CHANGE UP (ref 3.8–5.2)
RBC # FLD: 13.2 % — SIGNIFICANT CHANGE UP (ref 10.3–14.5)
RH IG SCN BLD-IMP: POSITIVE — SIGNIFICANT CHANGE UP
SARS-COV-2 IGG+IGM SERPL QL IA: >250 U/ML — HIGH
SARS-COV-2 IGG+IGM SERPL QL IA: POSITIVE
SODIUM SERPL-SCNC: 138 MMOL/L — SIGNIFICANT CHANGE UP (ref 135–145)
WBC # BLD: 9.69 K/UL — SIGNIFICANT CHANGE UP (ref 3.8–10.5)
WBC # FLD AUTO: 9.69 K/UL — SIGNIFICANT CHANGE UP (ref 3.8–10.5)

## 2022-03-06 RX ORDER — LEVOTHYROXINE SODIUM 125 MCG
50 TABLET ORAL DAILY
Refills: 0 | Status: DISCONTINUED | OUTPATIENT
Start: 2022-03-06 | End: 2022-03-08

## 2022-03-06 RX ORDER — OXYTOCIN 10 UNIT/ML
333.33 VIAL (ML) INJECTION
Qty: 20 | Refills: 0 | Status: DISCONTINUED | OUTPATIENT
Start: 2022-03-06 | End: 2022-03-07

## 2022-03-06 RX ORDER — CITRIC ACID/SODIUM CITRATE 300-500 MG
15 SOLUTION, ORAL ORAL EVERY 6 HOURS
Refills: 0 | Status: DISCONTINUED | OUTPATIENT
Start: 2022-03-06 | End: 2022-03-07

## 2022-03-06 RX ORDER — SODIUM CHLORIDE 9 MG/ML
1000 INJECTION, SOLUTION INTRAVENOUS
Refills: 0 | Status: DISCONTINUED | OUTPATIENT
Start: 2022-03-06 | End: 2022-03-07

## 2022-03-06 RX ADMIN — SODIUM CHLORIDE 125 MILLILITER(S): 9 INJECTION, SOLUTION INTRAVENOUS at 15:15

## 2022-03-06 NOTE — OB PROVIDER H&P - HISTORY OF PRESENT ILLNESS
HPI: 35yo F  @39+0 presents for scheduled induction for ICP, bile acids 15 and transaminitis. Patient currently on ursodiol and asymptomatic. Patient has been having [place] contractions for weeks. +FM. -LOF. -VB. Pt denies any other concerns.    PNC: Hypothyroid on Synthroid 50mcg. GBS neg.  EFW 3073g by anthony 3 weeks ago.    OBHx:  FT  8#7 c/b ICP            2019 D&C @11wga for acalvarium   GynHx: denies hx STIs, fibroids, polyps, cysts  PMH: denies hx clotting or bleeding disorders, HTN, DM  PSH: denies  PFH: no hx congential disorders, bleeding/clotting disorders  Psych: denies   Social: denies etoh, smoking, drugs. Safe at home/in relationship.  Meds: Ursodiol 300mg TID, Synthroid 50mcg, PNV   Allergies: NKDA  Will accept blood transfusions? Yes

## 2022-03-06 NOTE — OB PROVIDER H&P - NSHPLABSRESULTS_GEN_ALL_CORE
T(C): 36.8 (03-06-22 @ 15:38), Max: 36.8 (03-06-22 @ 14:52)  HR: 76 (03-06-22 @ 15:38) (76 - 76)  BP: 117/77 (03-06-22 @ 15:38) (117/77 - 117/77)  RR: 18 (03-06-22 @ 15:38) (18 - 18)  SpO2: --

## 2022-03-06 NOTE — OB RN PATIENT PROFILE - FALL HARM RISK - UNIVERSAL INTERVENTIONS
Bed in lowest position, wheels locked, appropriate side rails in place/Call bell, personal items and telephone in reach/Instruct patient to call for assistance before getting out of bed or chair/Non-slip footwear when patient is out of bed/[place] to call system/Physically safe environment - no spills, clutter or unnecessary equipment/Purposeful Proactive Rounding/Room/bathroom lighting operational, light cord in reach

## 2022-03-06 NOTE — OB PROVIDER H&P - ASSESSMENT
35yo  @39+0 IOL for ICP    Plan  - Admit to LND. Routine Labs. IVF.  - IOL w/ po cytotec -> pitocin  - Fetus: cat 1 tracing. VTX. EFW extrapolated to 3673g by sono. Continuous EFM. No concerns.  - Prenatal issues: ICP - bile acids 15, transaminitis of 34/, Hypothyroidism - on Synthroid 50mcg  - GBS neg, abx not required  - Pain: epidural PRN    Patient discussed with attending physician, Dr. Mckeon.    SHERINE Heck, PGY1

## 2022-03-06 NOTE — OB PROVIDER H&P - NSICDXPASTMEDICALHX_GEN_ALL_CORE_FT
PAST MEDICAL HISTORY:  Acute cystitis without hematuria 1/26/2019    Elevated liver enzymes     Hypothyroid levothyroxine 50 mcg mionday-friday; 100 mcg saturday & sunday    Normal vaginal delivery 2020    Proteinuria complicating pregnancy

## 2022-03-06 NOTE — OB PROVIDER H&P - NSHPPHYSICALEXAM_GEN_ALL_CORE
Gen: NAD  CV: RRR  Pulm: breathing comfortably on RA  Abd: gravid, nontender  Extr: moving all extremities with ease  – VE: 3.5/50/-3  – FHT Cat I: baseline 140, mod variability, +accels, -decels  – [place]: q6 min  – Sono: vertex

## 2022-03-06 NOTE — OB RN PATIENT PROFILE - NSICDXPASTMEDICALHX_GEN_ALL_CORE_FT
PAST MEDICAL HISTORY:  Acute cystitis without hematuria 1/26/2019    Elevated liver enzymes     Hypothyroid levothyroxine 50 mcg mionday-friday; 100 mcg saturday & sunday    Proteinuria complicating pregnancy      PAST MEDICAL HISTORY:  Acute cystitis without hematuria 1/26/2019    Elevated liver enzymes     Hypothyroid levothyroxine 50 mcg mionday-friday; 100 mcg saturday & sunday    Normal vaginal delivery 2020    Proteinuria complicating pregnancy

## 2022-03-07 ENCOUNTER — TRANSCRIPTION ENCOUNTER (OUTPATIENT)
Age: 37
End: 2022-03-07

## 2022-03-07 LAB
HBV SURFACE AG SERPL QL IA: SIGNIFICANT CHANGE UP
SARS-COV-2 RNA SPEC QL NAA+PROBE: SIGNIFICANT CHANGE UP
T PALLIDUM AB TITR SER: NEGATIVE — SIGNIFICANT CHANGE UP

## 2022-03-07 RX ORDER — HYDROCORTISONE 1 %
1 OINTMENT (GRAM) TOPICAL EVERY 6 HOURS
Refills: 0 | Status: DISCONTINUED | OUTPATIENT
Start: 2022-03-07 | End: 2022-03-08

## 2022-03-07 RX ORDER — PRAMOXINE HYDROCHLORIDE 150 MG/15G
1 AEROSOL, FOAM RECTAL EVERY 4 HOURS
Refills: 0 | Status: DISCONTINUED | OUTPATIENT
Start: 2022-03-07 | End: 2022-03-08

## 2022-03-07 RX ORDER — SODIUM CHLORIDE 9 MG/ML
3 INJECTION INTRAMUSCULAR; INTRAVENOUS; SUBCUTANEOUS EVERY 8 HOURS
Refills: 0 | Status: DISCONTINUED | OUTPATIENT
Start: 2022-03-07 | End: 2022-03-08

## 2022-03-07 RX ORDER — SIMETHICONE 80 MG/1
80 TABLET, CHEWABLE ORAL EVERY 4 HOURS
Refills: 0 | Status: DISCONTINUED | OUTPATIENT
Start: 2022-03-07 | End: 2022-03-08

## 2022-03-07 RX ORDER — DIPHENHYDRAMINE HCL 50 MG
25 CAPSULE ORAL EVERY 6 HOURS
Refills: 0 | Status: DISCONTINUED | OUTPATIENT
Start: 2022-03-07 | End: 2022-03-08

## 2022-03-07 RX ORDER — LEVOTHYROXINE SODIUM 125 MCG
1 TABLET ORAL
Qty: 0 | Refills: 0 | DISCHARGE

## 2022-03-07 RX ORDER — OXYCODONE HYDROCHLORIDE 5 MG/1
5 TABLET ORAL
Refills: 0 | Status: DISCONTINUED | OUTPATIENT
Start: 2022-03-07 | End: 2022-03-08

## 2022-03-07 RX ORDER — KETOROLAC TROMETHAMINE 30 MG/ML
30 SYRINGE (ML) INJECTION ONCE
Refills: 0 | Status: DISCONTINUED | OUTPATIENT
Start: 2022-03-07 | End: 2022-03-07

## 2022-03-07 RX ORDER — BENZOCAINE 10 %
1 GEL (GRAM) MUCOUS MEMBRANE EVERY 6 HOURS
Refills: 0 | Status: DISCONTINUED | OUTPATIENT
Start: 2022-03-07 | End: 2022-03-08

## 2022-03-07 RX ORDER — URSODIOL 250 MG/1
0 TABLET, FILM COATED ORAL
Qty: 0 | Refills: 0 | DISCHARGE

## 2022-03-07 RX ORDER — LANOLIN
1 OINTMENT (GRAM) TOPICAL EVERY 6 HOURS
Refills: 0 | Status: DISCONTINUED | OUTPATIENT
Start: 2022-03-07 | End: 2022-03-08

## 2022-03-07 RX ORDER — OXYCODONE HYDROCHLORIDE 5 MG/1
5 TABLET ORAL ONCE
Refills: 0 | Status: DISCONTINUED | OUTPATIENT
Start: 2022-03-07 | End: 2022-03-08

## 2022-03-07 RX ORDER — AER TRAVELER 0.5 G/1
1 SOLUTION RECTAL; TOPICAL EVERY 4 HOURS
Refills: 0 | Status: DISCONTINUED | OUTPATIENT
Start: 2022-03-07 | End: 2022-03-08

## 2022-03-07 RX ORDER — DIBUCAINE 1 %
1 OINTMENT (GRAM) RECTAL EVERY 6 HOURS
Refills: 0 | Status: DISCONTINUED | OUTPATIENT
Start: 2022-03-07 | End: 2022-03-08

## 2022-03-07 RX ORDER — ACETAMINOPHEN 500 MG
975 TABLET ORAL
Refills: 0 | Status: DISCONTINUED | OUTPATIENT
Start: 2022-03-07 | End: 2022-03-08

## 2022-03-07 RX ORDER — OXYTOCIN 10 UNIT/ML
333.33 VIAL (ML) INJECTION
Qty: 20 | Refills: 0 | Status: DISCONTINUED | OUTPATIENT
Start: 2022-03-07 | End: 2022-03-08

## 2022-03-07 RX ORDER — TETANUS TOXOID, REDUCED DIPHTHERIA TOXOID AND ACELLULAR PERTUSSIS VACCINE, ADSORBED 5; 2.5; 8; 8; 2.5 [IU]/.5ML; [IU]/.5ML; UG/.5ML; UG/.5ML; UG/.5ML
0.5 SUSPENSION INTRAMUSCULAR ONCE
Refills: 0 | Status: DISCONTINUED | OUTPATIENT
Start: 2022-03-07 | End: 2022-03-08

## 2022-03-07 RX ORDER — IBUPROFEN 200 MG
600 TABLET ORAL EVERY 6 HOURS
Refills: 0 | Status: DISCONTINUED | OUTPATIENT
Start: 2022-03-07 | End: 2022-03-08

## 2022-03-07 RX ORDER — IBUPROFEN 200 MG
600 TABLET ORAL EVERY 6 HOURS
Refills: 0 | Status: COMPLETED | OUTPATIENT
Start: 2022-03-07 | End: 2023-02-03

## 2022-03-07 RX ORDER — MAGNESIUM HYDROXIDE 400 MG/1
30 TABLET, CHEWABLE ORAL
Refills: 0 | Status: DISCONTINUED | OUTPATIENT
Start: 2022-03-07 | End: 2022-03-08

## 2022-03-07 RX ADMIN — SODIUM CHLORIDE 3 MILLILITER(S): 9 INJECTION INTRAMUSCULAR; INTRAVENOUS; SUBCUTANEOUS at 16:48

## 2022-03-07 RX ADMIN — Medication 0.2 MILLIGRAM(S): at 23:55

## 2022-03-07 RX ADMIN — Medication 0.2 MILLIGRAM(S): at 07:54

## 2022-03-07 RX ADMIN — Medication 1000 MILLIUNIT(S)/MIN: at 10:21

## 2022-03-07 RX ADMIN — Medication 975 MILLIGRAM(S): at 20:51

## 2022-03-07 RX ADMIN — SODIUM CHLORIDE 125 MILLILITER(S): 9 INJECTION, SOLUTION INTRAVENOUS at 04:15

## 2022-03-07 RX ADMIN — Medication 0.2 MILLIGRAM(S): at 16:08

## 2022-03-07 RX ADMIN — Medication 0.2 MILLIGRAM(S): at 20:19

## 2022-03-07 RX ADMIN — Medication 975 MILLIGRAM(S): at 20:19

## 2022-03-07 RX ADMIN — Medication 50 MICROGRAM(S): at 10:35

## 2022-03-07 RX ADMIN — Medication 30 MILLIGRAM(S): at 10:19

## 2022-03-07 RX ADMIN — Medication 30 MILLIGRAM(S): at 07:54

## 2022-03-07 RX ADMIN — Medication 0.2 MILLIGRAM(S): at 11:51

## 2022-03-07 RX ADMIN — SODIUM CHLORIDE 3 MILLILITER(S): 9 INJECTION INTRAMUSCULAR; INTRAVENOUS; SUBCUTANEOUS at 20:52

## 2022-03-07 RX ADMIN — Medication 600 MILLIGRAM(S): at 23:55

## 2022-03-07 NOTE — OB PROVIDER LABOR PROGRESS NOTE - NS_OBIHIFHRDETAILS_OBGYN_ALL_OB_FT
Baseline  155 Moderate variability. +Accels - Decels
145, mod dario, -accels, -decels
Baseline 150 Moderate variability. +Accels - Decels

## 2022-03-07 NOTE — OB RN DELIVERY SUMMARY - NSSELHIDDEN_OBGYN_ALL_OB_FT
[NS_DeliveryAttending1_OBGYN_ALL_OB_FT:[id number]=],[NS_DeliveryAssist1_OBGYN_ALL_OB_FT:[id number]=],[NS_DeliveryRN_OBGYN_ALL_OB_FT:[id number]=]

## 2022-03-07 NOTE — OB PROVIDER DELIVERY SUMMARY - NSVAGDELIVERYA_OBGYN_ALL_OB
Occupational Therapy 10LM  First treatment session by AMINATA Mckeon.    Visit Type: treatment    SUBJECTIVE  Patient agreed to participate in therapy this date.  RN approved session and agreeable to removal of mitts d/t pt pulling out IV prior date. Pt refuses allowing writer to don.Nurse report family will attempt to bring in     Writer attempted pocket talker however pt unable to still hear. Attempt written directions for pt with poor follow through.      With ambulation pt states \" get out of my way\" and \" no I'm going to stay in the chair\"  Patient / Family Goal: return to previous functional status and maximize function    Pain   RN informed on pain level    OBJECTIVE   Level of consciousness: alert    Oriented to person     Arousal alertness: delayed responses to stimuli and inconsistent responses to stimuli    Affect/Behavior: confused, irritable and impulsive  Patient activity tolerance: 1 to 2 activity to rest  Functional Communication/Cognition    Overall status:  Impaired  Bed mobility:    Rolling left: supervision    Rolling right: supervision    Supine to sit: supervision  Training completed:    Tasks: all aspects of bed mobility    Education details: patient safety, body mechanics and patient requires additional training  Transfers:    Assistive devices: gait belt, 1 person and 2-wheeled walker    Sit to stand: minimal assist    Stand to sit: minimal assist   Bed to chair: minimal assist, type:   Chair to bed: minimal assist, type:    Training completed:    Tasks: sit to stand and stand to sit    Education details: body mechanics, patient safety and patient requires additional training  Functional Ambulation:    Assistance:minimal assist   Assistive device:2-wheeled walker, 1 person and gait belt    Distance (ft): 10;10    Surface: even      Education details: body mechanics, patient safety and patient requires additional training  Activities of Daily Living (ADLs):  Grooming/Oral  Hygiene:     Grooming assist: set up    Oral hygiene assist: patient refused    Position: edge of bed    Assist needed for: wash/dry hands, wash/dry face and set up  Lower Body Dressing:     Footwear assistance: total assist - dependent    Footwear position: chair    Assist needed for: set up, steadying, verbal cueing, supervision/safety, increased time to complete, don/doff right sock and don/doff left sock  Activities of daily living training:   Pt very impulsive this date and hard to direct to complete ADL's. Pt ambulated without goal to ambulate prior to writer being able to don gaitbelt or retreive 2 WW able to have CNA assist. Pt seated in chair with poor follow through of donning socks required total A. Set up grooming EOB prior to ambulation. Pt overall mobility at 2 WW at min A.       Interventions    Training provided: ADL training, activity tolerance, balance retraining and transfer training  Skilled input: verbal instruction/cues  Verbal Consent: Writer verbally educated and received verbal consent for hand placement, positioning of patient, and techniques to be performed today from patient for clothing adjustments for techniques as described above and how they are pertinent to the patient's plan of care.        ASSESSMENT    Impairments: activity tolerance, balance, bed mobility, cognitive, coordination/proprioception, decreased insight into deficits, safety awareness, strength and communication  Functional Limitations: bed mobility, functional mobility, eating, grooming, bathing, toileting, functional transfers, dressing and showering    Treatment today focused on room mobility at 2 WW, LE dressing and grooming.   Current overall ADL status is Mod to max A.   Current overall Functional Mobility for ADL and Instrumental-ADL tasks is Min A. Patient displays fair progress demonstrated by increased functional mobility but hearing and cognition greatly affect session.    Functional limitations at this time  are listed above. Discussed patient goal, discharge planning options and therapy progress made to date with RN, SW, and CNA.     Discharge Recommendations:  Recommendations for Discharge: OT WI: Intensive therapy program, Sub-acute nursing home      PT/OT Mobility Equipment for Discharge: Will continue to assess  PT/OT ADL Equipment for Discharge: Will continue to assess  OT Identified Barriers to Discharge: expressive aphasia, weakness, cognition       Skilled therapy is required to address these limitations in attempt to maximize the patient's independence.  Progress: progressing toward goals    End of Session:   Location: in chair  Safety measures: alarm system in place/re-engaged, lines intact and call light within reach (pt refuses bilateral mitts)  Handoff to: nurse    PLAN  Suggestions for next session as indicated: Cognition with ADL's    Frequency Comments: M-F (3/24)          Interventions: activity tolerance training, ADL retraining, balance, bed mobility training, cognitive retraining, coordination, functional transfer training, patient education, positioning, patient/family training, transfer training, compensatory techniques, compensatory technique education, therapeutic activity, safety training and caregiver training  Agreement to plan and goals: patient agrees with goals and treatment plan      GOALS  Long Term Goals: (to be met by time of discharge from hospital)  Grooming: Patient will complete grooming tasks in standing and at sink modified independent.  Status: progressing/ongoing  Upper body dressing: Patient will complete upper body dressing in sitting and at bedside modified independent.  Status: progressing/ongoing  Lower body dressing: Patient will complete lower body dressing in sitting and at bedside modified independent.  Status: progressing/ongoing  Bathing: Patient will complete bathingmodified independent  Status: progressing/ongoingToilet transfer: Patient will complete toilet  transfer with modified independent.  Status: progressing/ongoing  Home setting transfer: Patient will complete home setting transfers with modified independent.  Status: progressing/ongoing        Documented in the chart in the following areas: Prior Level of Function. Pain. Assessment. Plan.      Therapy procedure time and total treatment time can be found documented on the Time Entry flowsheet    MARTINEZ note Cosign/Accountability:    First Time POC:   Patient progress, plan of care and goals reviewed between providing occupational therapist and certified occupational therapy assistant.  Certified occupational therapy assistant to continue with current plan of care, current goals are appropriate and patient is progressing towards these set goals.    Dottie Tyler, OT   Spontaneous

## 2022-03-07 NOTE — OB PROVIDER LABOR PROGRESS NOTE - NS_SUBJECTIVE/OBJECTIVE_OBGYN_ALL_OB_FT
Pt seen for reevaluation of the induction plan
R2 Labor Note     Patient examined for induction progress. Comfortable. Cervix remains long and posterior.
Pt felt a leakage of clear fluids. Feeling more pressure. Desires an epidural

## 2022-03-07 NOTE — DISCHARGE NOTE OB - PATIENT PORTAL LINK FT
You can access the FollowMyHealth Patient Portal offered by [facility] by registering at the following website: [email or web address]. By joining [employer]’s FollowMyHealth portal, you will also be able to view your health information using other applications (apps) compatible with our system.

## 2022-03-07 NOTE — DISCHARGE NOTE OB - MEDICATION SUMMARY - MEDICATIONS TO TAKE
I will START or STAY ON the medications listed below when I get home from the hospital:  None I will START or STAY ON the medications listed below when I get home from the hospital:    Tylenol 325 mg oral tablet  -- 3 tab(s) by mouth every 6 hours, As Needed  -- Indication: For Pain    ibuprofen 600 mg oral tablet  -- 1 tab(s) by mouth every 6 hours, As Needed  -- Indication: For Pain    dibucaine 1% topical ointment  -- 1 application on skin every 6 hours, As needed, Perineal discomfort  -- Indication: For Perineal care    witch hazel 50% topical pad  -- 1 application on skin every 4 hours, As needed, Perineal discomfort  -- Indication: For Perineal care

## 2022-03-07 NOTE — OB RN DELIVERY SUMMARY - NS_ADMITROM_OBGYN_ALL_OB
Patient Education     Eating Heart-Healthy Food: Using the DASH Plan    Eating for your heart doesn’t have to be hard or boring. You just need to know how to make healthier choices. The DASH eating plan has been developed to help you do just that. DASH stands for Dietary Approaches to Stop Hypertension. It is a plan that has been proven to be healthier for your heart and to lower your risk for high blood pressure. It can also help lower your risk for cancer, heart disease, osteoporosis, and diabetes.  Choosing from each food group  Choose foods from each of the food groups below each day. Try to get the recommended number of servings for each food group. The serving numbers are based on a diet of 2,000 calories a day. Talk with your healthcare provider if you’re not sure about your calorie needs. Along with getting the correct servings, the DASH plan also advises less than 2,300 mg of salt (sodium) per day. Lowering sodium intake to 1,500 mg per day lowers blood pressure even more. (There's about 2,300 mg of sodium in 1 teaspoon of salt.)      Grains  Servings: 6 to 8 a day  A serving is:  · 1 slice bread  · 1 ounce dry cereal  · Half a cup cooked rice, pasta or cereal  Best choices: Whole grains and any grains high in fiber. Vegetables  Servings: 4 to 5 a day  A serving is:  · 1 cup raw leafy vegetable  · Half a cup cut-up raw or cooked vegetable  · Half a cup vegetable juice  Best choices: Fresh or frozen vegetables prepared without added salt or fat.   Fruits  Servings: 4 to 5 a day  A serving is:  · 1 medium fruit  · One-quarter cup dried fruit  · Half a cup fresh, frozen, or canned fruit  · Half a cup of 100% fruit juices  Best choices: A variety of fresh fruits of different colors. Whole fruits are a better choice than fruit juices. Low-fat or fat-free dairy  Servings: 2 to 3 a day  A serving is:  · 1 cup milk  · 1 cup yogurt  · One and a half ounces cheese  Best choices: Skim or 1% milk, low-fat or fat-free  yogurt or buttermilk, and low-fat cheeses.         Lean meats, poultry, fish  Servings: 6 or fewer a day  A serving is:  · 1 ounce cooked meats, poultry, or fish  · 1 egg  Best choices: Lean poultry and fish. Trim away visible fat. Broil, grill, roast, or boil instead of frying. Remove skin from poultry before eating. Limit how much red meat you eat.  Nuts, seeds, beans  Servings: 4 to 5 a week  A serving is:  · One-third cup nuts (one and a half ounces)  · 2 tablespoons nut butter or seeds  · Half a cup cooked dry beans or legumes  Best choices: Dry roasted nuts with no salt added, lentils, kidney beans, garbanzo beans, and whole molina beans.   Fats and oils  Servings: 2 to 3 a day  A serving is:  · 1 teaspoon vegetable oil  · 1 teaspoon soft margarine  · 1 tablespoon mayonnaise  · 2 tablespoons salad dressing  Best choices: Nut and vegetable oils (nontropical vegetable oils), such as olive and canola oil. Sweets  Servings: 5 a week or fewer  A serving is:  · 1 tablespoon sugar, maple syrup, or honey  · 1 tablespoon jam or jelly  · 1 half-ounce jelly beans (about 15)  · 1 cup lemonade  Best choices: Dried fruit can be a satisfying sweet. Choose low-fat sweets. And watch your serving sizes!      For more on the DASH eating plan, visit:  www.nhlbi.nih.gov/health/health-topics/topics/dash   [employer] last reviewed this educational content on 7/1/2019 © 2000-2020 [employer]. [address] [place] [address], [place] [address]. All rights reserved. This information is not intended as a substitute for professional medical care. Always follow your healthcare professional's instructions.            No

## 2022-03-07 NOTE — OB RN DELIVERY SUMMARY - NS_SEPSISRSKCALC_OBGYN_ALL_OB_FT
EOS calculated successfully. EOS Risk Factor: 0.5/1000 live births ([facility] national incidence); GA=39w1d; Temp=98.42; ROM=0.933; GBS='Negative'; Antibiotics='No antibiotics or any antibiotics < 2 hrs prior to birth'

## 2022-03-07 NOTE — DISCHARGE NOTE OB - CARE PLAN
1 Principal Discharge DX:	 (spontaneous vaginal delivery)  Assessment and plan of treatment:	Routine Postpartum Care

## 2022-03-07 NOTE — OB PROVIDER DELIVERY SUMMARY - NSPROVIDERDELIVERYNOTE_OBGYN_ALL_OB_FT
Spontaneous [facility] Head, shoulders and body delivered easily. Infant was suctioned and handed off to mother/peds. Placenta delivered intact with a 3 vessel cord. Fundal massage was given and uterine fundus was found to be firm. Vaginal exam revealed an intact cervix, vaginal walls and sulci. 2nd degree laceration was noted along previous tear line. Excellent hemostasis noted. Patient was stable. Count was correct x2. APGARS 9/9. EBL 500mL QBL pending.    Amyeo Afroz Jereen, PGY-1 Spontaneous precipitous vaginal delivery of liveborn infant from [facility]. Head, shoulders and body delivered easily. Infant was suctioned and handed off to mother/peds. Placenta delivered intact with a 3 vessel cord. Fundal massage was given and uterine fundus was found to be soft with kong bleeding requiring bimanual message, and pp pitocin under pressure bag. Bleeding slowed s/p massage. Vaginal exam revealed an intact cervix, vaginal walls and sulci. 2nd degree laceration was noted along previous tear line. Excellent hemostasis noted. Patient was stable. Count was correct x2. APGARS 9/9. EBL 500mL QBL pending. Patient will be on pp Methergine series.     Amyeo Afroz Jereen, PGY-1

## 2022-03-07 NOTE — DISCHARGE NOTE OB - CARE PROVIDER_API CALL
Tiffany Chen)  [address], [address] [place] [address]  Phone: [phone]  Fax: [phone]  Follow Up Time:

## 2022-03-07 NOTE — DISCHARGE NOTE OB - MATERIALS PROVIDED
Immunization Record/Breastfeeding Log/Guide to Postpartum Care/Back To Sleep Handout/Shaken Baby Prevention Handout/Breastfeeding Guide and Packet/Birth Certificate Instructions

## 2022-03-07 NOTE — DISCHARGE NOTE OB - NS MD DC FALL RISK RISK
For information on Fall & Injury Prevention, visit: [email or web address] OR  [email or web address] OR  https://www.cdc.gov/steadi/patient.html

## 2022-03-07 NOTE — DISCHARGE NOTE OB - MEDICATION SUMMARY - MEDICATIONS TO STOP TAKING
I will STOP taking the medications listed below when I get home from the hospital:    ursodiol 250 mg oral capsule

## 2022-03-07 NOTE — OB PROVIDER DELIVERY SUMMARY - NSSELHIDDEN_OBGYN_ALL_OB_FT
[NS_DeliveryAttending1_OBGYN_ALL_OB_FT:[id number]],[NS_DeliveryAssist1_OBGYN_ALL_OB_FT:[id number]=]

## 2022-03-07 NOTE — OB PROVIDER LABOR PROGRESS NOTE - ASSESSMENT
at 39w1d IOL for ICP.     - cont PO cytotec for cervical ripening   - EFM Cat 1   - Cont EFM/[place]  - anticipate      Cayla Richmond MD PGY2  d/w Dr. Chen 
Approved for an epidural     DW: Dr. Gustavo Wetzel, PGY-1 
Continue PO cytotec   No epidural at this time. Pt desires one later on in course.     DW: Dr. Gustavo Wetzel, PGY-1

## 2022-03-08 VITALS
OXYGEN SATURATION: 98 % | SYSTOLIC BLOOD PRESSURE: 105 MMHG | RESPIRATION RATE: 18 BRPM | TEMPERATURE: 98 F | DIASTOLIC BLOOD PRESSURE: 75 MMHG | HEART RATE: 73 BPM

## 2022-03-08 LAB
HCT VFR BLD CALC: 33.1 % — LOW (ref 34.5–45)
HGB BLD-MCNC: 11.2 G/DL — LOW (ref 11.5–15.5)
MCHC RBC-ENTMCNC: 30.8 PG — SIGNIFICANT CHANGE UP (ref 27–34)
MCHC RBC-ENTMCNC: 33.8 GM/DL — SIGNIFICANT CHANGE UP (ref 32–36)
MCV RBC AUTO: 90.9 FL — SIGNIFICANT CHANGE UP (ref 80–100)
NRBC # BLD: 0 /100 WBCS — SIGNIFICANT CHANGE UP
NRBC # FLD: 0 K/UL — SIGNIFICANT CHANGE UP
PLATELET # BLD AUTO: 173 K/UL — SIGNIFICANT CHANGE UP (ref 150–400)
RBC # BLD: 3.64 M/UL — LOW (ref 3.8–5.2)
RBC # FLD: 13.2 % — SIGNIFICANT CHANGE UP (ref 10.3–14.5)
WBC # BLD: 10.56 K/UL — HIGH (ref 3.8–10.5)
WBC # FLD AUTO: 10.56 K/UL — HIGH (ref 3.8–10.5)

## 2022-03-08 RX ORDER — ACETAMINOPHEN 500 MG
3 TABLET ORAL
Qty: 0 | Refills: 0 | DISCHARGE
Start: 2022-03-08

## 2022-03-08 RX ORDER — IBUPROFEN 200 MG
1 TABLET ORAL
Qty: 0 | Refills: 0 | DISCHARGE
Start: 2022-03-08

## 2022-03-08 RX ORDER — AER TRAVELER 0.5 G/1
1 SOLUTION RECTAL; TOPICAL
Qty: 0 | Refills: 0 | DISCHARGE
Start: 2022-03-08

## 2022-03-08 RX ORDER — DIBUCAINE 1 %
1 OINTMENT (GRAM) RECTAL
Qty: 0 | Refills: 0 | DISCHARGE
Start: 2022-03-08

## 2022-03-08 RX ADMIN — Medication 600 MILLIGRAM(S): at 05:40

## 2022-03-08 RX ADMIN — Medication 600 MILLIGRAM(S): at 00:10

## 2022-03-08 RX ADMIN — SODIUM CHLORIDE 3 MILLILITER(S): 9 INJECTION INTRAMUSCULAR; INTRAVENOUS; SUBCUTANEOUS at 05:37

## 2022-03-08 RX ADMIN — Medication 600 MILLIGRAM(S): at 11:53

## 2022-03-08 RX ADMIN — Medication 50 MICROGRAM(S): at 05:41

## 2022-03-08 RX ADMIN — Medication 600 MILLIGRAM(S): at 06:32

## 2022-03-08 NOTE — PROGRESS NOTE ADULT - SUBJECTIVE AND OBJECTIVE BOX
Post-partum Note,   She is a  36y woman who is now post-partum day: 1    Subjective:  The patient feels well.  She is ambulating.   She is tolerating regular diet.  She denies nausea and vomiting; denies fever.  She is voiding.  Her pain is controlled.  She reports normal postpartum bleeding.  She is breastfeeding.  She is formula feeding.  She is bonding with infant.    Physical exam:    Vital Signs Last 24 Hrs  T(C): 36.7 (08 Mar 2022 06:32), Max: 36.9 (07 Mar 2022 15:08)  T(F): 98.1 (08 Mar 2022 06:32), Max: 98.4 (07 Mar 2022 15:08)  HR: 73 (08 Mar 2022 06:32) (69 - 73)  BP: 105/75 (08 Mar 2022 06:32) (105/75 - 123/76)  BP(mean): --  RR: 18 (08 Mar 2022 06:32) (18 - 18)  SpO2: 98% (08 Mar 2022 06:32) (96% - 98%)    Gen: NAD  Breast: Soft, nontender, not engorged.  Abdomen: Soft, nontender, no distension , firm uterine fundus at umbilicus.  Pelvic: Normal lochia noted  Ext: No calf tenderness    LABS:                        11.2   10.56 )-----------( 173      ( 08 Mar 2022 07:15 )             33.1       Rubella status:     Allergies    sulfa drugs (Other)    Intolerances      MEDICATIONS  (STANDING):  acetaminophen     Tablet .. 975 milliGRAM(s) Oral <User Schedule>  diphtheria/tetanus/pertussis (acellular) Vaccine (ADAcel) 0.5 milliLiter(s) IntraMuscular once  ibuprofen  Tablet. 600 milliGRAM(s) Oral every 6 hours  levothyroxine 50 MICROGram(s) Oral daily  oxytocin Infusion 333.333 milliUNIT(s)/Min (1000 mL/Hr) IV Continuous <Continuous>  prenatal multivitamin 1 Tablet(s) Oral daily  sodium chloride 0.9% lock flush 3 milliLiter(s) IV Push every 8 hours    MEDICATIONS  (PRN):  benzocaine 20%/menthol 0.5% Spray 1 Spray(s) Topical every 6 hours PRN for Perineal discomfort  dibucaine 1% Ointment 1 Application(s) Topical every 6 hours PRN Perineal discomfort  diphenhydrAMINE 25 milliGRAM(s) Oral every 6 hours PRN Pruritus  hydrocortisone 1% Cream 1 Application(s) Topical every 6 hours PRN Moderate Pain (4-6)  lanolin Ointment 1 Application(s) Topical every 6 hours PRN nipple soreness  magnesium hydroxide Suspension 30 milliLiter(s) Oral two times a day PRN Constipation  oxyCODONE    IR 5 milliGRAM(s) Oral every 3 hours PRN Moderate to Severe Pain (4-10)  oxyCODONE    IR 5 milliGRAM(s) Oral once PRN Moderate to Severe Pain (4-10)  pramoxine 1%/zinc 5% Cream 1 Application(s) Topical every 4 hours PRN Moderate Pain (4-6)  simethicone 80 milliGRAM(s) Chew every 4 hours PRN Gas  witch hazel Pads 1 Application(s) Topical every 4 hours PRN Perineal discomfort        Assessment and Plan  PPD #1 s/p   Doing well.  Increase ambulation.  Encourage breastfeeding.  PP & PPD Instructions reviewed.  PP educational materials reviewed & provided     D/C home today    Discussed with MD Tyrell Ratliff

## 2022-05-06 NOTE — OB PROVIDER H&P - NSLASTLIVEBIRTH_OBGYN_ALL_OB_DT
----- Message from Ana Porras PA-C sent at 5/5/2022  1:17 PM CDT -----  14-3-3 negative for rheumatoid arthritis.    06-Mar-2020

## 2023-01-03 NOTE — OB RN PATIENT PROFILE - BILL OF RIGHTS/ADMISSION INFORMATION PROVIDED TO:
Patient Burow's Graft Text: The defect edges were debeveled with a #15 scalpel blade.  Given the location of the defect, shape of the defect, the proximity to free margins and the presence of a standing cone deformity a Burow's skin graft was deemed most appropriate. The standing cone was removed and this tissue was then trimmed to the shape of the primary defect. The adipose tissue was also removed until only dermis and epidermis were left.  The skin margins of the secondary defect were undermined to an appropriate distance in all directions utilizing iris scissors.  The secondary defect was closed with interrupted buried subcutaneous sutures.  The skin edges were then re-apposed with running  sutures.  The skin graft was then placed in the primary defect and oriented appropriately.

## 2023-08-31 NOTE — OB PROVIDER TRIAGE NOTE - NS_DILATION_OBGYN_ALL_OB_NU
Informed patient that our office has temporarily relocated to [address], so when she comes in for appointment on Tuesday please do not go to [address], but come directly to [address] on the 3rd floor. Patient voiced understanding and agreed. Patient then confirmed appointment time, appointment is 11:15. No further questions at this time  
0

## 2023-12-21 NOTE — OB RN TRIAGE NOTE - CHIEF COMPLAINT QUOTE
sent from office with gaye
[facility]: Preventive Care And Screening: Tobacco Use: Screening And Cessation Intervention: Patient screened for tobacco use and is an ex/non-smoker
Detail Level: Detailed

## 2024-01-23 NOTE — OB RN PATIENT PROFILE - BLOOD TRANSFUSION, PREVIOUS, PROFILE
SW/CM Ongoing Infant Plan of Care Note     Support Systems   Mother, Father    Identified Barriers to Outcome/Discharge       Recommendations to Discharge Planning       Disposition  Home with parent    Progress Note  SW spoke with pts mom about recommendation for EI at discharge. SW provided education on programming.  Pts mom agreeable to referral. SW to send to local office. No further anticipated SW needs.     Mellisa Almaraz LCSW  NICU   [id number]         no

## 2025-01-21 NOTE — OB PROVIDER TRIAGE NOTE - NS_FETALMONCTX30_OBGYN_ALL_OB
Discharge Instructions After Your Colonoscopy    You have received Sedation/Anesthesia for your procedure.  Side effects from these medications can make you groggy and dull your reflexes today, so to keep you safe, we ask you to follow these guidelines:    General guidelines  - Do not drive a car or operate machines for the remainder of the day.  - Do not make important decisions for for the remainder of the day.  - Restart all normal activities the day after your test.  - Do not drink alcohol for the remainder of the day.    Complications  Complications from these tests are rare, but may include:  - Perforation (tear in the wall of the bowel)  - Bleeding  - Infection  - Drug reactions such as nausea and vomiting    Symptoms you should watch for after your procedure:  - Severe pain  - Abdomen (belly) is distended (swollen)and hard  - Possible signs of infection at your IV site include fever, swelling, heat, drainage, or redness  - Rectal bleeding of more than 1/2 cup. If you have hemorrhoids that are prone to bleeding, or if polyps were removed, you may see a small amount of blood on toilet paper when you wipe, or a drop or two in the toilet.  - Fever over 101º F  - Nausea or vomiting that is new to you as a result of your procedure    Pain   - There should be little or no pain after your procedure.  - You may continue to pass gas for the next several hours.  - If you have pain that is not relieved by passing gas, or pain that is new to you as a result of the procedure, call your doctor at the number below.    If you develop any of these symptoms, please call your doctor at [phone] during office hours (Monday-Friday 8am-4pm). Call the hospital  at [phone] after 4pm, weekends, holidays and ask for the GI physician on call for [place].    Results: Normal colonoscopy.  No specimens or polyps.      Recommendations:  - Repeat colonoscopy in 5 years, for family history of cancer  - Okay to restart  home medications (including antiplatelets and anticoagulants if any)  - If you develop severe abdominal pain and overt rectal bleeding. Please go to the nearest ER and bring this report with you   - Please call our surgery center if you develop any complications after the procedure  - Patient might need earlier colonoscopy if there are any new alarming symptoms (change in bowel habit, hematochezia or weight loss)    Diet Instructions: Resume your diet as tolerated today.     Instructions about Medications: Resume your medications as prescribed today.         Less than or equal to 5 Contractions in 30 minutes

## 2025-06-19 NOTE — OB PROVIDER TRIAGE NOTE - NS_FHRVARIABILITY_OBGYN_ALL_OB
[FreeTextEntry1] : Doing well, denying chest pain, shortness of breath, palpitations or dizziness  Exercising  Checked a few blood pressure numbers.  Brought her BP machine when she came from labs recently.  Reportedly, her systolic was 10 points higher on her home machine.  So that makes her numbers mostly 120s over 80s Moderate Variability